# Patient Record
Sex: FEMALE | Race: WHITE | NOT HISPANIC OR LATINO | Employment: FULL TIME | URBAN - METROPOLITAN AREA
[De-identification: names, ages, dates, MRNs, and addresses within clinical notes are randomized per-mention and may not be internally consistent; named-entity substitution may affect disease eponyms.]

---

## 2017-04-10 ENCOUNTER — TELEPHONE (OUTPATIENT)
Dept: SURGERY | Facility: AMBULATORY SURGERY CENTER | Age: 32
End: 2017-04-10

## 2017-04-10 NOTE — TELEPHONE ENCOUNTER
Spoke to patient and asked her to call back after her pre-op appointment on 04/12/17 with Dr. Mayen.

## 2017-04-21 RX ORDER — ALBUTEROL SULFATE 90 UG/1
2 AEROSOL, METERED RESPIRATORY (INHALATION) EVERY 6 HOURS
COMMUNITY

## 2017-04-25 ENCOUNTER — ANESTHESIA EVENT (OUTPATIENT)
Dept: SURGERY | Facility: AMBULATORY SURGERY CENTER | Age: 32
End: 2017-04-25

## 2017-04-25 ENCOUNTER — ANESTHESIA (OUTPATIENT)
Dept: SURGERY | Facility: AMBULATORY SURGERY CENTER | Age: 32
End: 2017-04-25

## 2017-04-25 ENCOUNTER — HOSPITAL ENCOUNTER (OUTPATIENT)
Facility: AMBULATORY SURGERY CENTER | Age: 32
Discharge: HOME | End: 2017-04-25
Attending: PLASTIC SURGERY | Admitting: PLASTIC SURGERY
Payer: COMMERCIAL

## 2017-04-25 VITALS
DIASTOLIC BLOOD PRESSURE: 72 MMHG | RESPIRATION RATE: 16 BRPM | TEMPERATURE: 97.9 F | OXYGEN SATURATION: 99 % | SYSTOLIC BLOOD PRESSURE: 122 MMHG

## 2017-04-25 DIAGNOSIS — R11.0 NAUSEA AFTER ANESTHESIA, INITIAL ENCOUNTER: ICD-10-CM

## 2017-04-25 DIAGNOSIS — B99.9 INFECTION: ICD-10-CM

## 2017-04-25 DIAGNOSIS — R52 PAIN: Primary | ICD-10-CM

## 2017-04-25 DIAGNOSIS — T88.59XA NAUSEA AFTER ANESTHESIA, INITIAL ENCOUNTER: ICD-10-CM

## 2017-04-25 LAB — BETA HCG QUAL IFA URINE: NEGATIVE

## 2017-04-25 PROCEDURE — G8916 PT W IV AB GIVEN ON TIME: HCPCS

## 2017-04-25 PROCEDURE — 19342 INSJ/RPLCMT BRST IMPLT SEP D: CPT | Mod: 50

## 2017-04-25 PROCEDURE — G8907 PT DOC NO EVENTS ON DISCHARG: HCPCS

## 2017-04-25 PROCEDURE — 19328 RMVL INTACT BREAST IMPLANT: CPT | Mod: 50

## 2017-04-25 PROCEDURE — 19340 INSJ BREAST IMPLT SM D MAST: CPT | Mod: 50

## 2017-04-25 PROCEDURE — 84703 CHORIONIC GONADOTROPIN ASSAY: CPT | Performed by: FAMILY MEDICINE

## 2017-04-25 PROCEDURE — L8600 IMPLANT BREAST SILICONE/EQ: HCPCS | Mod: 50

## 2017-04-25 DEVICE — IMPLANTABLE DEVICE: Type: IMPLANTABLE DEVICE | Site: BREAST | Status: FUNCTIONAL

## 2017-04-25 RX ORDER — CEFAZOLIN SODIUM 1 G/3ML
INJECTION, POWDER, FOR SOLUTION INTRAMUSCULAR; INTRAVENOUS PRN
Status: DISCONTINUED | OUTPATIENT
Start: 2017-04-25 | End: 2017-04-25

## 2017-04-25 RX ORDER — DIAZEPAM 10 MG
10 TABLET ORAL EVERY 12 HOURS PRN
Status: DISCONTINUED
Start: 2017-04-25 | End: 2017-04-26 | Stop reason: HOSPADM

## 2017-04-25 RX ORDER — NALOXONE HYDROCHLORIDE 0.4 MG/ML
.1-.4 INJECTION, SOLUTION INTRAMUSCULAR; INTRAVENOUS; SUBCUTANEOUS
Status: DISCONTINUED | OUTPATIENT
Start: 2017-04-25 | End: 2017-04-26 | Stop reason: HOSPADM

## 2017-04-25 RX ORDER — DEXAMETHASONE SODIUM PHOSPHATE 4 MG/ML
4 INJECTION, SOLUTION INTRA-ARTICULAR; INTRALESIONAL; INTRAMUSCULAR; INTRAVENOUS; SOFT TISSUE EVERY 10 MIN PRN
Status: DISCONTINUED | OUTPATIENT
Start: 2017-04-25 | End: 2017-04-26 | Stop reason: HOSPADM

## 2017-04-25 RX ORDER — FENTANYL CITRATE 50 UG/ML
25-50 INJECTION, SOLUTION INTRAMUSCULAR; INTRAVENOUS
Status: DISCONTINUED | OUTPATIENT
Start: 2017-04-25 | End: 2017-04-26 | Stop reason: HOSPADM

## 2017-04-25 RX ORDER — FENTANYL CITRATE 50 UG/ML
INJECTION, SOLUTION INTRAMUSCULAR; INTRAVENOUS PRN
Status: DISCONTINUED | OUTPATIENT
Start: 2017-04-25 | End: 2017-04-25

## 2017-04-25 RX ORDER — PROPOFOL 10 MG/ML
INJECTION, EMULSION INTRAVENOUS CONTINUOUS PRN
Status: DISCONTINUED | OUTPATIENT
Start: 2017-04-25 | End: 2017-04-25

## 2017-04-25 RX ORDER — OXYCODONE AND ACETAMINOPHEN 5; 325 MG/1; MG/1
1-2 TABLET ORAL
Status: DISCONTINUED | OUTPATIENT
Start: 2017-04-25 | End: 2017-04-26 | Stop reason: HOSPADM

## 2017-04-25 RX ORDER — CEPHALEXIN 500 MG/1
500 CAPSULE ORAL 2 TIMES DAILY
Qty: 14 CAPSULE | Refills: 0
Start: 2017-04-25 | End: 2017-05-02

## 2017-04-25 RX ORDER — BUPIVACAINE HYDROCHLORIDE AND EPINEPHRINE 2.5; 5 MG/ML; UG/ML
INJECTION, SOLUTION INFILTRATION; PERINEURAL PRN
Status: DISCONTINUED | OUTPATIENT
Start: 2017-04-25 | End: 2017-04-25 | Stop reason: HOSPADM

## 2017-04-25 RX ORDER — MEPERIDINE HYDROCHLORIDE 25 MG/ML
12.5 INJECTION INTRAMUSCULAR; INTRAVENOUS; SUBCUTANEOUS
Status: DISCONTINUED | OUTPATIENT
Start: 2017-04-25 | End: 2017-04-26 | Stop reason: HOSPADM

## 2017-04-25 RX ORDER — ONDANSETRON 4 MG/1
8 TABLET, ORALLY DISINTEGRATING ORAL EVERY 6 HOURS PRN
Qty: 8 TABLET | Refills: 0
Start: 2017-04-25 | End: 2024-07-09

## 2017-04-25 RX ORDER — PROPOFOL 10 MG/ML
INJECTION, EMULSION INTRAVENOUS PRN
Status: DISCONTINUED | OUTPATIENT
Start: 2017-04-25 | End: 2017-04-25

## 2017-04-25 RX ORDER — HYDROXYZINE HYDROCHLORIDE 25 MG/1
25 TABLET, FILM COATED ORAL
Status: DISCONTINUED | OUTPATIENT
Start: 2017-04-25 | End: 2017-04-26 | Stop reason: HOSPADM

## 2017-04-25 RX ORDER — ONDANSETRON 2 MG/ML
INJECTION INTRAMUSCULAR; INTRAVENOUS PRN
Status: DISCONTINUED | OUTPATIENT
Start: 2017-04-25 | End: 2017-04-25

## 2017-04-25 RX ORDER — HYDROXYZINE PAMOATE 25 MG/1
25 CAPSULE ORAL EVERY 6 HOURS PRN
Qty: 30 CAPSULE | Refills: 0
Start: 2017-04-25 | End: 2024-07-09

## 2017-04-25 RX ORDER — HYDRALAZINE HYDROCHLORIDE 20 MG/ML
2.5-5 INJECTION INTRAMUSCULAR; INTRAVENOUS EVERY 10 MIN PRN
Status: DISCONTINUED | OUTPATIENT
Start: 2017-04-25 | End: 2017-04-26 | Stop reason: HOSPADM

## 2017-04-25 RX ORDER — ONDANSETRON 4 MG/1
4 TABLET, ORALLY DISINTEGRATING ORAL EVERY 30 MIN PRN
Status: DISCONTINUED | OUTPATIENT
Start: 2017-04-25 | End: 2017-04-26 | Stop reason: HOSPADM

## 2017-04-25 RX ORDER — CEFAZOLIN SODIUM 2 G/100ML
2 INJECTION, SOLUTION INTRAVENOUS
Status: COMPLETED | OUTPATIENT
Start: 2017-04-25 | End: 2017-04-25

## 2017-04-25 RX ORDER — HYDROCODONE BITARTRATE AND ACETAMINOPHEN 5; 325 MG/1; MG/1
1-2 TABLET ORAL EVERY 4 HOURS PRN
Qty: 30 TABLET | Refills: 0
Start: 2017-04-25 | End: 2024-07-09

## 2017-04-25 RX ORDER — SODIUM CHLORIDE, SODIUM LACTATE, POTASSIUM CHLORIDE, CALCIUM CHLORIDE 600; 310; 30; 20 MG/100ML; MG/100ML; MG/100ML; MG/100ML
INJECTION, SOLUTION INTRAVENOUS CONTINUOUS
Status: DISCONTINUED | OUTPATIENT
Start: 2017-04-25 | End: 2017-04-26 | Stop reason: HOSPADM

## 2017-04-25 RX ORDER — HYDROMORPHONE HYDROCHLORIDE 1 MG/ML
.3-.5 INJECTION, SOLUTION INTRAMUSCULAR; INTRAVENOUS; SUBCUTANEOUS EVERY 10 MIN PRN
Status: DISCONTINUED | OUTPATIENT
Start: 2017-04-25 | End: 2017-04-26 | Stop reason: HOSPADM

## 2017-04-25 RX ORDER — LIDOCAINE HYDROCHLORIDE 20 MG/ML
INJECTION, SOLUTION INFILTRATION; PERINEURAL PRN
Status: DISCONTINUED | OUTPATIENT
Start: 2017-04-25 | End: 2017-04-25

## 2017-04-25 RX ORDER — DEXAMETHASONE SODIUM PHOSPHATE 4 MG/ML
10 INJECTION, SOLUTION INTRA-ARTICULAR; INTRALESIONAL; INTRAMUSCULAR; INTRAVENOUS; SOFT TISSUE EVERY 6 HOURS
Status: DISCONTINUED | OUTPATIENT
Start: 2017-04-25 | End: 2017-04-26 | Stop reason: HOSPADM

## 2017-04-25 RX ORDER — ONDANSETRON 2 MG/ML
4 INJECTION INTRAMUSCULAR; INTRAVENOUS EVERY 30 MIN PRN
Status: DISCONTINUED | OUTPATIENT
Start: 2017-04-25 | End: 2017-04-26 | Stop reason: HOSPADM

## 2017-04-25 RX ORDER — GLYCOPYRROLATE 0.2 MG/ML
INJECTION, SOLUTION INTRAMUSCULAR; INTRAVENOUS PRN
Status: DISCONTINUED | OUTPATIENT
Start: 2017-04-25 | End: 2017-04-25

## 2017-04-25 RX ORDER — LIDOCAINE 40 MG/G
CREAM TOPICAL
Status: DISCONTINUED | OUTPATIENT
Start: 2017-04-25 | End: 2017-04-26 | Stop reason: HOSPADM

## 2017-04-25 RX ORDER — ACETAMINOPHEN 325 MG/1
975 TABLET ORAL ONCE
Status: COMPLETED | OUTPATIENT
Start: 2017-04-25 | End: 2017-04-25

## 2017-04-25 RX ORDER — GABAPENTIN 300 MG/1
300 CAPSULE ORAL ONCE
Status: COMPLETED | OUTPATIENT
Start: 2017-04-25 | End: 2017-04-25

## 2017-04-25 RX ORDER — OXYCODONE AND ACETAMINOPHEN 5; 325 MG/1; MG/1
1-2 TABLET ORAL EVERY 4 HOURS PRN
Qty: 30 TABLET | Refills: 0
Start: 2017-04-25 | End: 2024-07-09

## 2017-04-25 RX ORDER — OXYCODONE HYDROCHLORIDE 5 MG/1
5 TABLET ORAL EVERY 4 HOURS PRN
Status: DISCONTINUED | OUTPATIENT
Start: 2017-04-25 | End: 2017-04-26 | Stop reason: HOSPADM

## 2017-04-25 RX ORDER — ONDANSETRON 4 MG/1
4 TABLET, ORALLY DISINTEGRATING ORAL
Status: DISCONTINUED | OUTPATIENT
Start: 2017-04-25 | End: 2017-04-26 | Stop reason: HOSPADM

## 2017-04-25 RX ORDER — METOPROLOL TARTRATE 1 MG/ML
1-2 INJECTION, SOLUTION INTRAVENOUS EVERY 5 MIN PRN
Status: DISCONTINUED | OUTPATIENT
Start: 2017-04-25 | End: 2017-04-26 | Stop reason: HOSPADM

## 2017-04-25 RX ADMIN — CEFAZOLIN SODIUM 2 G: 2 INJECTION, SOLUTION INTRAVENOUS at 07:28

## 2017-04-25 RX ADMIN — ACETAMINOPHEN 975 MG: 325 TABLET ORAL at 07:10

## 2017-04-25 RX ADMIN — SODIUM CHLORIDE, SODIUM LACTATE, POTASSIUM CHLORIDE, CALCIUM CHLORIDE: 600; 310; 30; 20 INJECTION, SOLUTION INTRAVENOUS at 07:16

## 2017-04-25 RX ADMIN — SODIUM CHLORIDE, SODIUM LACTATE, POTASSIUM CHLORIDE, CALCIUM CHLORIDE: 600; 310; 30; 20 INJECTION, SOLUTION INTRAVENOUS at 07:25

## 2017-04-25 RX ADMIN — FENTANYL CITRATE 100 MCG: 50 INJECTION, SOLUTION INTRAMUSCULAR; INTRAVENOUS at 07:27

## 2017-04-25 RX ADMIN — HYDROMORPHONE HYDROCHLORIDE 0.5 MG: 1 INJECTION, SOLUTION INTRAMUSCULAR; INTRAVENOUS; SUBCUTANEOUS at 10:18

## 2017-04-25 RX ADMIN — SODIUM CHLORIDE, SODIUM LACTATE, POTASSIUM CHLORIDE, CALCIUM CHLORIDE: 600; 310; 30; 20 INJECTION, SOLUTION INTRAVENOUS at 08:15

## 2017-04-25 RX ADMIN — CEFAZOLIN SODIUM 1 G: 1 INJECTION, POWDER, FOR SOLUTION INTRAMUSCULAR; INTRAVENOUS at 09:33

## 2017-04-25 RX ADMIN — Medication 10 MG: at 08:15

## 2017-04-25 RX ADMIN — GABAPENTIN 300 MG: 300 CAPSULE ORAL at 07:10

## 2017-04-25 RX ADMIN — HYDROMORPHONE HYDROCHLORIDE 0.5 MG: 1 INJECTION, SOLUTION INTRAMUSCULAR; INTRAVENOUS; SUBCUTANEOUS at 10:08

## 2017-04-25 RX ADMIN — GLYCOPYRROLATE 0.2 MG: 0.2 INJECTION, SOLUTION INTRAMUSCULAR; INTRAVENOUS at 07:26

## 2017-04-25 RX ADMIN — PROPOFOL 150 MCG/KG/MIN: 10 INJECTION, EMULSION INTRAVENOUS at 07:30

## 2017-04-25 RX ADMIN — DEXAMETHASONE SODIUM PHOSPHATE 10 MG: 4 INJECTION, SOLUTION INTRA-ARTICULAR; INTRALESIONAL; INTRAMUSCULAR; INTRAVENOUS; SOFT TISSUE at 07:50

## 2017-04-25 RX ADMIN — LIDOCAINE HYDROCHLORIDE 40 MG: 20 INJECTION, SOLUTION INFILTRATION; PERINEURAL at 07:28

## 2017-04-25 RX ADMIN — PROPOFOL 200 MG: 10 INJECTION, EMULSION INTRAVENOUS at 07:30

## 2017-04-25 RX ADMIN — ONDANSETRON 4 MG: 2 INJECTION INTRAMUSCULAR; INTRAVENOUS at 08:00

## 2017-04-25 RX ADMIN — OXYCODONE HYDROCHLORIDE 5 MG: 5 TABLET ORAL at 10:50

## 2017-04-25 NOTE — ANESTHESIA PREPROCEDURE EVALUATION
Anesthesia Evaluation     . Pt has had prior anesthetic. Type: General    No history of anesthetic complications          ROS/MED HX    ENT/Pulmonary:     (+)Mild Persistent asthma Treatment: Inhaler daily,  , . .    Neurologic:  - neg neurologic ROS     Cardiovascular:  - neg cardiovascular ROS       METS/Exercise Tolerance:     Hematologic:  - neg hematologic  ROS       Musculoskeletal:  - neg musculoskeletal ROS       GI/Hepatic:  - neg GI/hepatic ROS       Renal/Genitourinary:  - ROS Renal section negative       Endo:  - neg endo ROS       Psychiatric:  - neg psychiatric ROS       Infectious Disease:  - neg infectious disease ROS       Malignancy:      - no malignancy   Other:    (+) No chance of pregnancy                    Physical Exam  Normal systems: cardiovascular, pulmonary and dental    Airway   Mallampati: I  TM distance: >3 FB  Neck ROM: full    Dental     Cardiovascular       Pulmonary    breath sounds clear to auscultation                    Anesthesia Plan      History & Physical Review  History and physical reviewed and following examination; no interval change.    ASA Status:  2 .    NPO Status:  > 8 hours    Plan for General and LMA with Intravenous and Propofol induction. Maintenance will be TIVA.    PONV prophylaxis:  Ondansetron (or other 5HT-3) and Dexamethasone or Solumedrol       Postoperative Care  Postoperative pain management:  Multi-modal analgesia.      Consents                          .

## 2017-04-25 NOTE — IP AVS SNAPSHOT
[facility]    [address] [place] [address]    Phone:  [phone]                                       After Visit Summary   4/25/2017    Dana Hernandez    MRN: [id number]           After Visit Summary Signature Page     I have received my discharge instructions, and my questions have been answered. I have discussed any challenges I see with this plan with the nurse or doctor.    ..........................................................................................................................................  Patient/Patient Representative Signature      ..........................................................................................................................................  Patient Representative Print Name and Relationship to Patient    ..................................................               ................................................  Date                                            Time    ..........................................................................................................................................  Reviewed by Signature/Title    ...................................................              ..............................................  Date                                                            Time

## 2017-04-25 NOTE — BRIEF OP NOTE
Preop Diagnosis: hypomastia  Postop diagnosis: same  Operation: remove and replace breast implants, plication of bilateral breas tpockets  EBL<30cc  Complications: none  Assist: none  Findings: none  Specimens: none  Condition: extubated and stable to PAR    Raimundo Mayen MD

## 2017-04-25 NOTE — DISCHARGE INSTRUCTIONS
[facility]  Same-Day Surgery   Adult Discharge Orders & Instructions   For 24 hours after surgery  1. Get plenty of rest.  A responsible adult must stay with you for at least 24 hours after you leave the hospital.   2. Do not drive or use heavy equipment.  If you have weakness or tingling, don't drive or use heavy equipment until this feeling goes away.  3. Do not drink alcohol.  4. Avoid strenuous or risky activities.  Ask for help when climbing stairs.   5. You may feel lightheaded.  IF so, sit for a few minutes before standing.  Have someone help you get up.   6. If you have nausea (feel sick to your stomach): Drink only clear liquids such as apple juice, ginger ale, broth or 7-Up.  Rest may also help.  Be sure to drink enough fluids.  Move to a regular diet as you feel able.  7. You may have a slight fever. Call the doctor if your fever is over 100 F (37.7 C) (taken under the tongue) or lasts longer than 24 hours.  8. You may have a dry mouth, a sore throat, muscle aches or trouble sleeping.  These should go away after 24 hours.  9. Do not make important or legal decisions.   Call your doctor for any of the followin.  Signs of infection (fever, growing tenderness at the surgery site, a large amount of drainage or bleeding, severe pain, foul-smelling drainage, redness, swelling).    2. It has been over 8 to 10 hours since surgery and you are still not able to urinate (pass water).    3.  Headache for over 24 hours.    4.  Numbness, tingling or weakness the day after surgery (if you had spinal anesthesia).  To contact a doctor, call ___[phone]________________________

## 2017-04-25 NOTE — IP AVS SNAPSHOT
MRN:[id number]                      After Visit Summary   4/25/2017    Dana Hernandez    MRN: [id number]           Thank you!     Thank you for choosing [facility] for your care. Our goal is always to provide you with excellent care. Hearing back from our patients is one way we can continue to improve our services. Please take a few minutes to complete the written survey that you may receive in the mail after you visit with us. Thank you!        Patient Information     Date Of Birth          1985        About your hospital stay     You were admitted on:  April 25, 2017 You last received care in the:  [facility]    You were discharged on:  April 25, 2017       Who to Call     For medical emergencies, please call 911.  For non-urgent questions about your medical care, please call your primary care provider or clinic, None  For questions related to your surgery, please call your surgery clinic        Attending Provider     Provider Specialty    Raimundo Mayen MD Plastic Surgery       Primary Care Provider    None Specified       No primary provider on file.        After Care Instructions     Discharge Instructions       Resume pre procedure diet            Discharge Instructions       Lifting limit of  20 pounds for 2 weeks. May begin gentle arm stretches this afternoon            Discharge Instructions       Leave surgical bra on overnight until seen in clinic tomorrow            Discharge Instructions       Follow up with Surgeon in [place] tomorrow at 1pm.  Call clinic with any problems.            No Alcohol       No Alcohol for 24 hours post procedure            No Aspirin, Ibuprofen or Naproxen       No Aspirin, Ibuprofen or Naproxen products for 7 - 10 days following surgery            No driving or operating machinery       No driving or operating machinery until day after procedure                  Further instructions from your care team       [facility]  Same-Day Surgery   Adult Discharge Orders & Instructions   For 24 hours after surgery  1. Get plenty of rest.  A responsible adult must stay with you for at least 24 hours after you leave the hospital.   2. Do not drive or use heavy equipment.  If you have weakness or tingling, don't drive or use heavy equipment until this feeling goes away.  3. Do not drink alcohol.  4. Avoid strenuous or risky activities.  Ask for help when climbing stairs.   5. You may feel lightheaded.  IF so, sit for a few minutes before standing.  Have someone help you get up.   6. If you have nausea (feel sick to your stomach): Drink only clear liquids such as apple juice, ginger ale, broth or 7-Up.  Rest may also help.  Be sure to drink enough fluids.  Move to a regular diet as you feel able.  7. You may have a slight fever. Call the doctor if your fever is over 100 F (37.7 C) (taken under the tongue) or lasts longer than 24 hours.  8. You may have a dry mouth, a sore throat, muscle aches or trouble sleeping.  These should go away after 24 hours.  9. Do not make important or legal decisions.   Call your doctor for any of the followin.  Signs of infection (fever, growing tenderness at the surgery site, a large amount of drainage or bleeding, severe pain, foul-smelling drainage, redness, swelling).    2. It has been over 8 to 10 hours since surgery and you are still not able to urinate (pass water).    3.  Headache for over 24 hours.    4.  Numbness, tingling or weakness the day after surgery (if you had spinal anesthesia).  To contact a doctor, call ___[phone]________________________       Pending Results     No orders found from 2017 to 2017.            Admission Information     Date & Time Provider Department Dept. Phone    2017 Raimundo Mayen MD [facility] [phone]      Your Vitals Were     Blood Pressure Temperature Respirations Pulse Oximetry          129/72 97.9  F  "(36.6  C) (Temporal) 16 100%        [employer] Information     [email or web address] lets you send messages to your doctor, view your test results, renew your prescriptions, schedule appointments and more. To sign up, go to [email or web address] . Click on \"Log in\" on the left side of the screen, which will take you to the Welcome page. Then click on \"Sign up Now\" on the right side of the page.     You will be asked to enter the access code listed below, as well as some personal information. Please follow the directions to create your username and password.     Your access code is: [email or web address]  Expires: 2017  9:53 AM     Your access code will  in 90 days. If you need help or a new code, please call your [facility] clinic or [phone].        Care EveryWhere ID     This is your Care EveryWhere ID. This could be used by other organizations to access your [facility] medical records  [id number]           Review of your medicines      UNREVIEWED medicines. Ask your doctor about these medicines        Dose / Directions    albuterol 108 (90 BASE) MCG/ACT Inhaler   Commonly known as:  PROAIR HFA/PROVENTIL HFA/VENTOLIN HFA   Indication:  Asthma        Dose:  2 puff   Inhale 2 puffs into the lungs every 6 hours   Refills:  0       mometasone-formoterol 100-5 MCG/ACT oral inhaler   Commonly known as:  DULERA   Indication:  Asthma        Dose:  2 puff   Inhale 2 puffs into the lungs 2 times daily   Refills:  0         START taking        Dose / Directions    cephALEXin 500 MG capsule   Commonly known as:  KEFLEX   Used for:  Infection        Dose:  500 mg   Take 1 capsule (500 mg) by mouth 2 times daily for 7 days Start with 2 tabs this afternoon, one ore at bedtime tonight. Filled as Duricef   Quantity:  14 capsule   Refills:  0       HYDROcodone-acetaminophen 5-325 MG per tablet   Commonly known as:  NORCO   Used for:  Pain        Dose:  1-2 tablet   Take 1-2 tablets by mouth every 4 hours as needed for other (Moderate to " Severe Pain)   Quantity:  30 tablet   Refills:  0       hydrOXYzine 25 MG capsule   Commonly known as:  VISTARIL   Used for:  Pain        Dose:  25 mg   Take 1 capsule (25 mg) by mouth every 6 hours as needed for other (take with Norco or percocet to enhance pain relief)   Quantity:  30 capsule   Refills:  0       ondansetron 4 MG ODT tab   Commonly known as:  ZOFRAN-ODT   Used for:  Nausea after anesthesia, initial encounter        Dose:  8 mg   Take 2 tablets (8 mg) by mouth every 6 hours as needed for nausea   Quantity:  8 tablet   Refills:  0       oxyCODONE-acetaminophen 5-325 MG per tablet   Commonly known as:  PERCOCET   Used for:  Pain        Dose:  1-2 tablet   Take 1-2 tablets by mouth every 4 hours as needed for pain   Quantity:  30 tablet   Refills:  0            Where to get your medicines      Some of these will need a paper prescription and others can be bought over the counter. Ask your nurse if you have questions.     You don't need a prescription for these medications     cephALEXin 500 MG capsule    HYDROcodone-acetaminophen 5-325 MG per tablet    hydrOXYzine 25 MG capsule    ondansetron 4 MG ODT tab    oxyCODONE-acetaminophen 5-325 MG per tablet                Protect others around you: Learn how to safely use, store and throw away your medicines at www.disposemymeds.org.             Medication List: This is a list of all your medications and when to take them. Check marks below indicate your daily home schedule. Keep this list as a reference.      Medications           Morning Afternoon Evening Bedtime As Needed    albuterol 108 (90 BASE) MCG/ACT Inhaler   Commonly known as:  PROAIR HFA/PROVENTIL HFA/VENTOLIN HFA   Inhale 2 puffs into the lungs every 6 hours                                cephALEXin 500 MG capsule   Commonly known as:  KEFLEX   Take 1 capsule (500 mg) by mouth 2 times daily for 7 days Start with 2 tabs this afternoon, one ore at bedtime tonight. Filled as Duricef                                 HYDROcodone-acetaminophen 5-325 MG per tablet   Commonly known as:  NORCO   Take 1-2 tablets by mouth every 4 hours as needed for other (Moderate to Severe Pain)                                hydrOXYzine 25 MG capsule   Commonly known as:  VISTARIL   Take 1 capsule (25 mg) by mouth every 6 hours as needed for other (take with Norco or percocet to enhance pain relief)                                mometasone-formoterol 100-5 MCG/ACT oral inhaler   Commonly known as:  DULERA   Inhale 2 puffs into the lungs 2 times daily                                ondansetron 4 MG ODT tab   Commonly known as:  ZOFRAN-ODT   Take 2 tablets (8 mg) by mouth every 6 hours as needed for nausea                                oxyCODONE-acetaminophen 5-325 MG per tablet   Commonly known as:  PERCOCET   Take 1-2 tablets by mouth every 4 hours as needed for pain

## 2017-04-25 NOTE — ANESTHESIA POSTPROCEDURE EVALUATION
Patient: Dana Hernandez    Procedure(s):  Remove saline breast implants, replace with silicone implants, plicate bilateral breast folds - Wound Class: I-Clean    Diagnosis:replace implants  Diagnosis Additional Information: No value filed.    Anesthesia Type:  General, LMA    Note:  Anesthesia Post Evaluation    Patient location during evaluation: PACU  Patient participation: Able to fully participate in evaluation  Level of consciousness: awake  Pain management: adequate  Airway patency: patent  Cardiovascular status: acceptable  Respiratory status: acceptable  Hydration status: acceptable  PONV: none     Anesthetic complications: None    Comments: No nausea.  Excellent recovery noted.        Last vitals:  Vitals:    04/25/17 1010 04/25/17 1015 04/25/17 1020   BP: 128/68 132/72 128/67   Resp: 28 22 16   Temp:      SpO2: 99% 99% 98%         Electronically Signed By: Unruly Paula MD  April 25, 2017  10:33 AM

## 2017-04-25 NOTE — ANESTHESIA CARE TRANSFER NOTE
Patient: Dana Hernandez    Procedure(s):  Remove saline breast implants, replace with silicone implants, plicate bilateral breast folds - Wound Class: I-Clean    Diagnosis: replace implants  Diagnosis Additional Information: No value filed.    Anesthesia Type:   General, LMA     Note:  Airway :Room Air  Patient transferred to:PACU  Comments: Care of Transfer report given to RN.  Spont Respirations. Responds   Easy.      Vitals: (Last set prior to Anesthesia Care Transfer)    CRNA VITALS  4/25/2017 0933 - 4/25/2017 1003      4/25/2017             Pulse: 121    SpO2: 99 %    Resp Rate (observed): (!)  2                Electronically Signed By: EMILIANO MARTEL CRNA  April 25, 2017  10:03 AM

## 2017-05-15 NOTE — OP NOTE
DATE OF PROCEDURE:  04/25/2017      PREOPERATIVE DIAGNOSES:   1.  History of breast augmentation by Dr. Anisa Solorzano in 05/2006 with saline breast implants.   2.  Bottoming of left saline breast implant.   3.  Implant malposition with lateralization of the breast pockets left greater than right.      POSTOPERATIVE DIAGNOSES:   1.  History of breast augmentation by Dr. Anisa Solorzano in 05/2006 with saline breast implants.   2.  Bottoming of left saline breast implant.   3.  Implant malposition with lateralization of the breast pockets left greater than right.      PROCEDURE:  Removal of saline breast implants placement and replacement silicone gel breast implants and plication of breast pocket, both with elevation of the left inframammary fold as well as plication of the curvilinear lateral aspect of her breasts, left greater than right.        REPLACEMENT BREAST IMPLANTS:     1.  Left breast [place] style 4000 400 mL smooth round high profile [place] MemoryGel breast implant, reference number [id number], lot number is [id number], serial number is [id number].     2.  Right breast [place] style 4000 400 mL smooth round high profile [place] MemoryGel breast implant, reference number [id number], lot number is [id number], serial number is [id number].      ANESTHESIA:  General endotracheal.      INDICATIONS:  Dana Hernandez is a very pleasant and attractive 31-year-old female who underwent saline breast implants, breast augmentation by Dr. Anisa Solorzano in 05/2006 when she was 20 years of age.  The patient received saline breast implants.  For many years she has been troubled by breast implant malposition whereby her left breast has both bottomed out and has a lateral malposition.  Her right breast is primarily a problem of lateral breast implant malposition.  The patient is troubled by rippling and wrinkling of her saline breast implants, particularly in the areas where the pockets have become lateralized.  The  patient was given options for saline and silicone gel breast implants.  The patient was told that primary of saline breast implant is that the fill material being saline is absorbed should the implant ever fail.  The patient is well aware of saline breast implants has the distinct disadvantage of increased tendency towards rippling and wrinkling, this is especially true in the lateral or outer quadrants of the breast.  Silicone gel breast implant have the inherent of the less intensity towards rippling and wrinkling due to the viscosity of the Gelfilm material.  Silicone gel breast implants may rupture and neither the patient nor the surgeon are aware of this; this phenomenon is known as silent rupture.  I recommend my patients all have a breast MRI scan at 10 years postoperatively, to check on the integrity of their breast implants.  A study by Seferino Ruiz at the [facility] showed under 1% failure rate for silicone gel breast implants at 5 years.  A 10 year surveillance MRI seems to be reasonable timing.  The patient was told silicone gel breast implants do have a slightly higher tendency towards capsular contracture than saline breast implants.  In my practice the chance of capsular contracture is between 2-3% when implants placed in a submuscular position through an inframammary fold incision as would be the case for this patient.  Maneuvers surgeons can utilize to diminish chance of capsular contracture include submuscular breast implant placement.  Submuscular breast implant placement confers a nearly 3 fold reduction in rate of capsular contracture from 15% in the subglandular breast augmentation to 5% seen in submuscular breast augmentation.  Use of inframammary fold incision was shown to confer a 5-fold reduction rate of capsular contracture in clinical studies performed by Dr. Dominguez Freedman, from [facility].  In his studies, the rate of capsular contracture was 0.59%  when inframammary fold incision was used and 9.5% when a periareolar incision was used.  This dramatic decrease in rate of capsular contracture is felt to be due to the fact that the nipple areolar complex cannot be completely sterilized and therefore susceptible to development of a biofilm.  Throughout my career I have used breast implant pocket irrigations consisting consist of Ancef, gentamicin and bacitracin to decrease the chance of capsular contracture.  Use of these pocket irrigations has been shown to decrease the rate of capsular contracture in clinical studies performed by Dr. Jaleel Perera, of [facility].  For the last 3 years I have been using a Nevarez funnel for placement of breast implants.  The Nevarez funnel should theoretically decrease the rate of capsular contracture as it allows for no-touch technique for placement of the breast implant.      The patient understands that when she is age 39 she should have screening mammograms.  She should tell her mammographer she has breast implants as special views known as Jay views and special compression techniques are used to best visualize the breast following augmentation mammoplasty.  The risks of the operation include chance of capsular contracture, bleeding, infection, hematoma, seroma, hypertrophic scarring and recurrence of her implant malposition from original surgery performed by Dr. Solorzano.  The patient was given a chance to ask questions and all were answered.  The patient provides her informed consent for the procedure both at the [facility] [place] as well as well as she gave consent to our in-office consent form.      DESCRIPTION OF PROCEDURE:  The patient was taken to the operating room, placed in supine position on the operating room table.  A successful general endotracheal anesthetic was then induced.  The patient received 2 grams of Ancef and 10 mg of Decadron intravenously prior to commencing  with surgery.  The patient's chest was then prepped and draped in routine sterile fashion.  The old scar from Dr. Solorzano's breast augmentation was excised.  Through this incision dissection was developed down to the breast capsule.  The right breast implant is a saline breast was completely intact upon removal.  In the preoperative holding area I had marked the extent of her pocket lateralization.  Using this marking as a guide, I inserted a needle through the skin into the pocket where it was marked with methylene blue and withdrawn.  A series of dots was created, which showed where her natural breast pocket should start.  This was then transferred by means marking pen to her chest wall.  The intervening capsule was removed between the markings.  I also reinforced the inframammary fold and in curvilinear fashion starting medially as well to follow a new areolar base diameter to correspond with the 400 mL high profile breast implant to be used.  The pocket plication sutures were then placed using 2-0 PDS suture in curvilinear fashion, the sutures placed every centimeter with 2-0 PDS in horizontal mattressing fashion.  The saline sizer was placed gave nice shape to her breast.  I then performed a capsulorrhaphy in longitudinal and latitudinal fashion to allow for a more projecting high profile breast implant.  The pocket was then irrigated with a solution consisting of 1 gram of Ancef, 80 mg of gentamicin and bacitracin in saline.  This was done in accordance as stated by Dr. Jaleel Perera, [facility], as a clinically proven means of diminishing the chance of capsular contracture.  The breast pocket was then injected with 0.25% Marcaine with 1:200,000 epinephrine and Kenalog for postoperative analgesia and for the anti-inflammatory properties of the steroid.  At this point, all operating personnel changed their gloves.  The [place] MemoryGel breast implant was opened on the back table and soaked  in the antibiotic solution.  Nevarez funnel was then opened and trimmed to the appropriate size for a 400 mL implant.  The Nevarez funnel was lubricated with the antibiotic solution.  The implant was emptied from its sterile container into the Nevarez funnel then inserted into the right breast pocket using a no-touch technique.  Closure of the inframammary fold incision starting at the capsule layer with 2-0 PDS suture in buried interrupted fashion.  Deep dermal sutures were then placed using 4-0 PDS suture in buried interrupted fashion.  I then placed intradermal suture of 4-0 Monocryl in buried interrupted fashion.  The final skin closure was performed using 4-0 Prolene suture in running and buried continuous intracuticular fashion.      Attention was then directed to the left side where the identical operation was performed.  On the left side, I had to treat severe bottoming out of the breast pocket as well as lateralization.  The lateralization was worse on the left than right.  Again, used the markings obtained in the preoperative holding area to transfer the curvilinear natural lateral border of her breast internally using the 25-gauge needle and methylene blue.  The markings were transferred to the chest wall and the capsule between the markings was removed, pocket plicating sutures were placed in curvilinear fashion treating both the lateralization and bottoming out of breast pocket with 2-0 PDS sutures placed every centimeter in horizontal mattressing fashion.  Again, the breast pocket was made to be more projecting with capsulorrhaphies performed in longitudinal and latitudinal fashion to accept the more projecting 400 mL smooth round high profile breast implant.  Again, the breast pocket was irrigated with the Ancef, gentamicin and bacitracin solution.  The pocket was then injected with 0.25% Marcaine with 1:200,000 epinephrine and Kenalog for postoperative analgesia for the anti-inflammatory properties of  the steroids.  The implant was then opened on the back table and soaked in antibiotic solution.  The Nevarez funnel was then lubricated with the antibiotic solution.  The implant was transferred from the sterile container into the Nevarez funnel and inserted using a no-touch technique.  Closure was identical from left to right.  Dressing consisted of half-inch Steri-Strips over the inframammary fold incisions followed by Adaptic, Kerlix fluffs and surgical block.  It should be noted 0.25% Marcaine with 1:100,000 epinephrine was injected to total additional 15 mL each side both beneath the incision and the lateral aspect of the breast where the third, fourth and fifth intercostal nerves innervate the breast for postoperative analgesia.        I should note that an incision was made at 7:37 a.m.  Patient received 1500 mL of intravenous crystalloid fluid.  Estimated blood loss was 25 mL.         MERI LONDONO MD             D: 05/15/2017 11:34   T: 05/15/2017 15:42   MT: [id number]      Name:     BRAEDEN ADLER   MRN:      [id number]        Account:        [id number]   :      1985           Procedure Date: 2017      Document: [id number]

## 2023-01-17 ENCOUNTER — TRANSCRIBE ORDERS (OUTPATIENT)
Dept: OTHER | Age: 38
End: 2023-01-17

## 2023-01-17 DIAGNOSIS — L73.2 HIDRADENITIS SUPPURATIVA: Primary | ICD-10-CM

## 2023-05-09 NOTE — TELEPHONE ENCOUNTER
FUTURE VISIT INFORMATION      FUTURE VISIT INFORMATION:    Date: 6.26.23    Time: 9:50    [place] [facility] [place] INFORMATION:    Referring provider:  Yanely    Referring providers clinic:  [facility]    Reason for visit/diagnosis  Hidradenitis suppurativa,     referred by [facility]    RECORDS REQUESTED FROM:       Clinic name Comments Records Status Imaging Status   [facility] 12.9.22, 10.29.21  Yanely AVILES

## 2023-06-26 ENCOUNTER — PRE VISIT (OUTPATIENT)
Dept: DERMATOLOGY | Facility: CLINIC | Age: 38
End: 2023-06-26

## 2023-06-26 ENCOUNTER — OFFICE VISIT (OUTPATIENT)
Dept: DERMATOLOGY | Facility: CLINIC | Age: 38
End: 2023-06-26
Attending: INTERNAL MEDICINE
Payer: COMMERCIAL

## 2023-06-26 DIAGNOSIS — L73.2 HIDRADENITIS SUPPURATIVA: Primary | ICD-10-CM

## 2023-06-26 PROCEDURE — 99204 OFFICE O/P NEW MOD 45 MIN: CPT | Performed by: DERMATOLOGY

## 2023-06-26 RX ORDER — DEXTROAMPHETAMINE SACCHARATE, AMPHETAMINE ASPARTATE MONOHYDRATE, DEXTROAMPHETAMINE SULFATE AND AMPHETAMINE SULFATE 6.25; 6.25; 6.25; 6.25 MG/1; MG/1; MG/1; MG/1
CAPSULE, EXTENDED RELEASE ORAL
COMMUNITY
Start: 2023-03-04

## 2023-06-26 RX ORDER — CLINDAMYCIN PHOSPHATE 11.9 MG/ML
SOLUTION TOPICAL 2 TIMES DAILY
Qty: 60 ML | Refills: 11 | Status: SHIPPED | OUTPATIENT
Start: 2023-06-26 | End: 2024-07-01

## 2023-06-26 RX ORDER — SPIRONOLACTONE 100 MG/1
100 TABLET, FILM COATED ORAL AT BEDTIME
Qty: 90 TABLET | Refills: 3 | Status: SHIPPED | OUTPATIENT
Start: 2023-06-26 | End: 2024-07-01

## 2023-06-26 ASSESSMENT — PAIN SCALES - GENERAL: PAINLEVEL: NO PAIN (0)

## 2023-06-26 NOTE — PROGRESS NOTES
[facility] Dermatology Note    Encounter Date: Jun 26, 2023    Dermatology Problem List:  1. Hidradenitis suppurativa: early Del Cid II affecting pubis/groin > axillae  - current: chlorhexidine, clindamycin, spironolactone 100 mg at bedtime, OCPs  - referral for LHR  - prior: doxycycline  2. Recurrent right axillary subcutaneous nodule: possibly related to hidradenitis suppurativa  - prior imaging nonspecific    ______________________________________    Impression/Plan:  1. Hidradenitis suppurativa: early Del Cid II affecting pubis/groin > axillae. Some early scarring and sinus tract formation. We discussed risks/benefits of next steps in treatment including topicals, systemic antibiotics, spironolactone, metformin, adalimumab, LHR. Will add spironolactone and refer for LHR.  - spironolactone 100 mg at bedtime  - chlorhexidine, clindamycin solution  - referral for laser hair removal      Follow-up in 3-4 months.       Staff Involved:  Staff Only      Unruly Kuo MD   of Dermatology  Department of Dermatology  [facility]      CC:   Chief Complaint   Patient presents with     Derm Problem     Dana is here for hidradenitis suppurativa evaluation.        History of Present Illness:  Ms. Dana Hernandez is a 37 year old female who presents as a new patient.    Hidradenitis suppurativa - last 2-3 years  - with scarring, tunneling  - worse around period - restarted birth control 3-4 months ago - not much difference  - a little better with clindamycin and chlorhexidine  - prior doxycycline - helpful when taking but recurred when stopped    Right axillary lump - multiple assessments by imaging - waxes and wanes in severity  - causing radiating pain on right side of body    Labs:  N/A    Physical exam:  Vitals: There were no vitals taken for this visit.  GEN: This is a well developed, well-nourished female in no acute distress, in a pleasant mood.     SKIN: Plunkett phototype III  - Focused examination of the axillae, groin, buttocks, pubic area was performed.  - multiple erythematous scarred papules on the pubis and groin with early sinus tract formation and several scattered erythematous papules in the axillae  - No other lesions of concern on areas examined.     Past Medical History:   Past Medical History:   Diagnosis Date     Uncomplicated asthma      Past Surgical History:   Procedure Laterality Date     COSMETIC SURGERY       REMOVE AND REPLACE BREAST IMPLANT PROSTHESIS Bilateral 4/25/2017    Procedure: REMOVE AND REPLACE BREAST IMPLANT PROSTHESIS;  Remove saline breast implants, replace with silicone implants, plicate bilateral breast folds;  Surgeon: Raimundo Mayen MD;  Location:  OR       Social History:   reports that she has never smoked. She does not have any smokeless tobacco history on file. She reports that she does not drink alcohol and does not use drugs.    Family History:  No family history on file.    Medications:  Current Outpatient Medications   Medication Sig Dispense Refill     albuterol (PROAIR HFA/PROVENTIL HFA/VENTOLIN HFA) 108 (90 BASE) MCG/ACT Inhaler Inhale 2 puffs into the lungs every 6 hours       amphetamine-dextroamphetamine (ADDERALL XR) 25 MG 24 hr capsule TAKE 1 CAPSULE BY MOUTH EACH MORNING       mometasone-formoterol (DULERA) 100-5 MCG/ACT oral inhaler Inhale 2 puffs into the lungs 2 times daily       HYDROcodone-acetaminophen (NORCO) 5-325 MG per tablet Take 1-2 tablets by mouth every 4 hours as needed for other (Moderate to Severe Pain) (Patient not taking: Reported on 6/26/2023) 30 tablet 0     hydrOXYzine (VISTARIL) 25 MG capsule Take 1 capsule (25 mg) by mouth every 6 hours as needed for other (take with Norco or percocet to enhance pain relief) (Patient not taking: Reported on 6/26/2023) 30 capsule 0     ondansetron (ZOFRAN-ODT) 4 MG ODT tab Take 2 tablets (8 mg) by mouth every 6 hours as needed for  nausea (Patient not taking: Reported on 6/26/2023) 8 tablet 0     oxyCODONE-acetaminophen (PERCOCET) 5-325 MG per tablet Take 1-2 tablets by mouth every 4 hours as needed for pain (Patient not taking: Reported on 6/26/2023) 30 tablet 0     No Known Allergies

## 2023-06-26 NOTE — NURSING NOTE
Dermatology Rooming Note    Dana Hernandez's goals for this visit include:   Chief Complaint   Patient presents with     Derm Problem     Dana is here for hidradenitis suppurativa evaluation.      Jacky Leyva, EMT

## 2023-06-26 NOTE — PROGRESS NOTES
[facility] Dermatology Note  Encounter Date: Jun 26, 2023  Office Visit     Dermatology Problem List:  1. ***    ____________________________________________    Assessment & Plan:    # {Diagnosesderm:105108}.   {kkplans:049397}   - ***     # {Diagnosesderm:655474}.   {kkplans:874268}   - ***     Procedures Performed:   {kkprocedurenotes:448078}  {kkproceduremedstudent:801718}    Follow-up: {kkfollowup:685995}    Staff and Medical Student:     ***    ***  ____________________________________________    CC: Derm Problem (Dana is here for hidradenitis suppurativa evaluation. )    HPI:  Ms. Dana Hernandez is a(n) 37 year old female who presents today {kknew/return:059863} for ***    Patient is otherwise feeling well, without additional skin concerns.    Labs:  {kkreviewedlabs:330031} reviewed.    Physical Exam:  Vitals: There were no vitals taken for this visit.  SKIN: {kkSkinExam:979368}  - ***  - ***  - ***  - No other lesions of concern on areas examined.     Medications:  Current Outpatient Medications   Medication     albuterol (PROAIR HFA/PROVENTIL HFA/VENTOLIN HFA) 108 (90 BASE) MCG/ACT Inhaler     HYDROcodone-acetaminophen (NORCO) 5-325 MG per tablet     hydrOXYzine (VISTARIL) 25 MG capsule     mometasone-formoterol (DULERA) 100-5 MCG/ACT oral inhaler     ondansetron (ZOFRAN-ODT) 4 MG ODT tab     oxyCODONE-acetaminophen (PERCOCET) 5-325 MG per tablet     No current facility-administered medications for this visit.      Past Medical History:   There is no problem list on file for this patient.    Past Medical History:   Diagnosis Date     Uncomplicated asthma         CC Greg Ny, DO  [address]  [place] [address] on close of this encounter.

## 2023-07-13 ENCOUNTER — MYC MEDICAL ADVICE (OUTPATIENT)
Dept: DERMATOLOGY | Facility: CLINIC | Age: 38
End: 2023-07-13
Payer: COMMERCIAL

## 2023-07-13 DIAGNOSIS — D84.9 IMMUNOSUPPRESSION (H): Primary | ICD-10-CM

## 2023-07-13 DIAGNOSIS — L73.2 HIDRADENITIS SUPPURATIVA: ICD-10-CM

## 2023-07-15 ENCOUNTER — HEALTH MAINTENANCE LETTER (OUTPATIENT)
Age: 38
End: 2023-07-15

## 2023-07-19 ENCOUNTER — LAB (OUTPATIENT)
Dept: LAB | Facility: CLINIC | Age: 38
End: 2023-07-19
Payer: COMMERCIAL

## 2023-07-19 ENCOUNTER — OFFICE VISIT (OUTPATIENT)
Dept: DERMATOLOGY | Facility: CLINIC | Age: 38
End: 2023-07-19
Attending: DERMATOLOGY
Payer: COMMERCIAL

## 2023-07-19 DIAGNOSIS — L73.2 HIDRADENITIS SUPPURATIVA: ICD-10-CM

## 2023-07-19 DIAGNOSIS — Z51.81 MEDICATION MONITORING ENCOUNTER: ICD-10-CM

## 2023-07-19 DIAGNOSIS — Z51.81 MEDICATION MONITORING ENCOUNTER: Primary | ICD-10-CM

## 2023-07-19 LAB
BUN SERPL-MCNC: 11.2 MG/DL (ref 6–20)
CREAT SERPL-MCNC: 0.8 MG/DL (ref 0.51–0.95)
GFR SERPL CREATININE-BSD FRML MDRD: >90 ML/MIN/1.73M2

## 2023-07-19 PROCEDURE — 99214 OFFICE O/P EST MOD 30 MIN: CPT | Performed by: DERMATOLOGY

## 2023-07-19 PROCEDURE — 84520 ASSAY OF UREA NITROGEN: CPT | Performed by: PATHOLOGY

## 2023-07-19 PROCEDURE — 82565 ASSAY OF CREATININE: CPT | Performed by: PATHOLOGY

## 2023-07-19 PROCEDURE — 36415 COLL VENOUS BLD VENIPUNCTURE: CPT | Performed by: PATHOLOGY

## 2023-07-19 ASSESSMENT — PAIN SCALES - GENERAL: PAINLEVEL: NO PAIN (0)

## 2023-07-19 NOTE — LETTER
"7/19/2023       RE: Dana Hernandez  [address] [address]     Dear Colleague,    Thank you for referring your patient, Dana Hernandez, to the [facility] DERMATOLOG[facility] [place] at [facility]. Please see a copy of my visit note below.    [facility] Dermatology Note  Encounter Date: Jul 19, 2023  Office Visit       Dermatology Problem List:  1. Hidradenitis suppurativa: early Del Cid II affecting pubis/groin > axillae  - Current: chlorhexidine, clindamycin, spironolactone 100 mg at bedtime, OCPs  - Rrior: doxycycline  2. Recurrent right axillary subcutaneous nodule: possibly related to hidradenitis suppurativa  - Rrior imaging nonspecific     ______________________________________     Impression/Plan:  1. Hidradenitis suppurativa: early Del Cid II affecting pubis/groin > axillae- is on junaid. Happy with this and wants to add laser hair removal.  She will continue junaid and check labs   - spironolactone 100 mg okay to go to am - we will check BUN and creatinine.   - \"chlorhexidine, clindamycin solution\"-pt wants to continue no change from Dr. Kuo  - referral for laser hair removal  -future from Dr. Kuo \"including topicals, systemic antibiotics, spironolactone, metformin, adalimumab, LHR.\"  -humira (has ankylosing spondylitis she reports)    - The nature and purpose of the procedure, associated risks, possible consequences, complications and alternative methods of treatment were explained in detail, this includes but is not limited to hyperpigmentation, hypopigmentation, scarring, bruising, hair loss pain/discomfort, eye injury, paradoxical hair growth, hives, infection, itching  and blister.  We reviewed that eye protection must be worn and cannot be removed during the procedure.  We reviewed that the outcome could be any of the following: no improvement, slight improvement or change in skin color & texture, the skin " might be permanently lighter or darker, and though uncommon, superficial scarring may occur.  Multiple treatments are required. Typically 3-6 treatments are needed to see disease improvement but improvement is not guaranteed.  The patient understood that  the treatment may not work and that this is not an FDA approved treatment for hidradenitis and is not a first line treatment. The use of numbing topicals can help with pain, but does not eliminate pain. We reviewed with patients that laser hair removal is a reduction in hair and not permanent.   Reviewed that the patient can terminate the procedure at anytime.         We review that the patient will have numbing with topical cream prior to the procedures unless this is declined. Patient would like to move forward with this medication.   Pt notified 3 attempts and then we will remove of list of no answer    Numbing checklist:  Does the patient have allergy to numbing medication (lidocaine/benzocaine/tetracaine): yes/no: No  Is the patient pregnant? yes/no: No  Does the patient report a new skin lesion or rash in the area to be numbed? yes/no: No  Is the patient under the age of a18? yes/no: No  Is the patient less than 75kg  in weight? yes/no: weight is okay for LMX application  Patient was counseled risks of toxicity on this medication including neurologic, cardia.     Medication administration:  We will plan for LMX at axilla and mons pubis/folds.     Scheduling: The patient was notified that 3 attempts will be made to contact them and if no response they will move to the back of our wait list. After 1 year, the consult will need to be repeated.              #history of tanning bed history  -schedule with Dr. Kuo within 1 year             Procedures Performed:   NA    Follow-up:  For HS laser, moved to wait list.     Staff and Scribe:     Francisco Javier OROZCO, am serving as a scribe to document services personally performed by Rosalia Rome MD based on data  collection and the provider's statements to me.    Provider Disclosure:   The documentation recorded by the scribe accurately reflects the services I personally performed and the decisions made by me.    Rosalia Rome MD    Department of Dermatology  [facility]: Phone: [phone], Fax:[phone]  [facility]: Phone: [phone], Fax: [phone]    ____________________________________________    CC: Derm Problem (Dana is here for a laser hair removal consult. )    HPI:  Ms. Dana Hernandez is a(n) 38 year old female who presents today as a new patient for LHR consult for HS. Last seen and referred June 26, 2023 by Dr. Kuo.     Today, reports HS. Has seen private practive for quote.     Want sto treat the axilla and also flares on mons pubis and groin    Is on spironolactone and no side effects and feels well mostly but some nauseau    No history of hair removal  Patient is otherwise feeling well, without additional skin concerns.    Labs Reviewed:  NA  Physical Exam:  Vitals: There were no vitals taken for this visit.  SKIN:     Normal axilla  -scarring and comedones on mons pubis.   - No other lesions of concern on areas examined.     Medications:  Current Outpatient Medications   Medication    albuterol (PROAIR HFA/PROVENTIL HFA/VENTOLIN HFA) 108 (90 BASE) MCG/ACT Inhaler    amphetamine-dextroamphetamine (ADDERALL XR) 25 MG 24 hr capsule    chlorhexidine (HIBICLENS) 4 % liquid    clindamycin (CLEOCIN T) 1 % external solution    mometasone-formoterol (DULERA) 100-5 MCG/ACT oral inhaler    spironolactone (ALDACTONE) 100 MG tablet    HYDROcodone-acetaminophen (NORCO) 5-325 MG per tablet    hydrOXYzine (VISTARIL) 25 MG capsule    ondansetron (ZOFRAN-ODT) 4 MG ODT tab    oxyCODONE-acetaminophen (PERCOCET) 5-325 MG per tablet     No current facility-administered medications for this  visit.      Past Medical History:   There is no problem list on file for this patient.    Past Medical History:   Diagnosis Date    Uncomplicated asthma         CC Unruly Kuo MD  [address] [place] [address] on close of this encounter.

## 2023-07-19 NOTE — NURSING NOTE
Dermatology Rooming Note    Dana Hernandez's goals for this visit include:   Chief Complaint   Patient presents with     Derm Problem     Dana is here for a laser hair removal consult.      Ginny Carmona, visit facilitator

## 2023-07-19 NOTE — PROGRESS NOTES
"[facility] Dermatology Note  Encounter Date: Jul 19, 2023  Office Visit       Dermatology Problem List:  1. Hidradenitis suppurativa: early Del Cid II affecting pubis/groin > axillae  - Current: chlorhexidine, clindamycin, spironolactone 100 mg at bedtime, OCPs  - Rrior: doxycycline  2. Recurrent right axillary subcutaneous nodule: possibly related to hidradenitis suppurativa  - Rrior imaging nonspecific     ______________________________________     Impression/Plan:  1. Hidradenitis suppurativa: early Del Cid II affecting pubis/groin > axillae- is on junaid. Happy with this and wants to add laser hair removal.  She will continue junaid and check labs   - spironolactone 100 mg okay to go to am - we will check BUN and creatinine.   - \"chlorhexidine, clindamycin solution\"-pt wants to continue no change from Dr. Kuo  - referral for laser hair removal  -future from Dr. Kuo \"including topicals, systemic antibiotics, spironolactone, metformin, adalimumab, LHR.\"  -humira (has ankylosing spondylitis she reports)    - The nature and purpose of the procedure, associated risks, possible consequences, complications and alternative methods of treatment were explained in detail, this includes but is not limited to hyperpigmentation, hypopigmentation, scarring, bruising, hair loss pain/discomfort, eye injury, paradoxical hair growth, hives, infection, itching  and blister.  We reviewed that eye protection must be worn and cannot be removed during the procedure.  We reviewed that the outcome could be any of the following: no improvement, slight improvement or change in skin color & texture, the skin might be permanently lighter or darker, and though uncommon, superficial scarring may occur.  Multiple treatments are required. Typically 3-6 treatments are needed to see disease improvement but improvement is not guaranteed.  The patient understood that  the treatment may not work and that this is not an FDA " approved treatment for hidradenitis and is not a first line treatment. The use of numbing topicals can help with pain, but does not eliminate pain. We reviewed with patients that laser hair removal is a reduction in hair and not permanent.   Reviewed that the patient can terminate the procedure at anytime.         We review that the patient will have numbing with topical cream prior to the procedures unless this is declined. Patient would like to move forward with this medication.   Pt notified 3 attempts and then we will remove of list of no answer    Numbing checklist:  Does the patient have allergy to numbing medication (lidocaine/benzocaine/tetracaine): yes/no: No  Is the patient pregnant? yes/no: No  Does the patient report a new skin lesion or rash in the area to be numbed? yes/no: No  Is the patient under the age of a18? yes/no: No  Is the patient less than 75kg  in weight? yes/no: weight is okay for LMX application  Patient was counseled risks of toxicity on this medication including neurologic, cardia.     Medication administration:  We will plan for LMX at axilla and mons pubis/folds.     Scheduling: The patient was notified that 3 attempts will be made to contact them and if no response they will move to the back of our wait list. After 1 year, the consult will need to be repeated.              #history of tanning bed history  -schedule with Dr. Kuo within 1 year             Procedures Performed:   NA    Follow-up:  For HS laser, moved to wait list.     Staff and Scribe:     I, Francisco Javier Lemus, am serving as a scribe to document services personally performed by Rosalia Rome MD based on data collection and the provider's statements to me.    Provider Disclosure:   The documentation recorded by the scribe accurately reflects the services I personally performed and the decisions made by me.    Rosalia Rome MD    Department of Dermatology  [facility]  [facility]: Phone: [phone], Fax:[phone]  [facility]: Phone: [phone], Fax: [phone]    ____________________________________________    CC: Derm Problem (Dana is here for a laser hair removal consult. )    HPI:  Ms. Dana Hernandez is a(n) 38 year old female who presents today as a new patient for LHR consult for HS. Last seen and referred June 26, 2023 by Dr. Kuo.     Today, reports HS. Has seen private practive for quote.     Want sto treat the axilla and also flares on mons pubis and groin    Is on spironolactone and no side effects and feels well mostly but some nauseau    No history of hair removal  Patient is otherwise feeling well, without additional skin concerns.    Labs Reviewed:  NA  Physical Exam:  Vitals: There were no vitals taken for this visit.  SKIN:     Normal axilla  -scarring and comedones on mons pubis.   - No other lesions of concern on areas examined.     Medications:  Current Outpatient Medications   Medication     albuterol (PROAIR HFA/PROVENTIL HFA/VENTOLIN HFA) 108 (90 BASE) MCG/ACT Inhaler     amphetamine-dextroamphetamine (ADDERALL XR) 25 MG 24 hr capsule     chlorhexidine (HIBICLENS) 4 % liquid     clindamycin (CLEOCIN T) 1 % external solution     mometasone-formoterol (DULERA) 100-5 MCG/ACT oral inhaler     spironolactone (ALDACTONE) 100 MG tablet     HYDROcodone-acetaminophen (NORCO) 5-325 MG per tablet     hydrOXYzine (VISTARIL) 25 MG capsule     ondansetron (ZOFRAN-ODT) 4 MG ODT tab     oxyCODONE-acetaminophen (PERCOCET) 5-325 MG per tablet     No current facility-administered medications for this visit.      Past Medical History:   There is no problem list on file for this patient.    Past Medical History:   Diagnosis Date     Uncomplicated asthma         CC Unruly Kuo MD  [address] [place] [address] on close of this encounter.

## 2023-07-20 ENCOUNTER — TELEPHONE (OUTPATIENT)
Dept: DERMATOLOGY | Facility: CLINIC | Age: 38
End: 2023-07-20
Payer: COMMERCIAL

## 2023-07-27 ENCOUNTER — TELEPHONE (OUTPATIENT)
Dept: DERMATOLOGY | Facility: CLINIC | Age: 38
End: 2023-07-27
Payer: COMMERCIAL

## 2023-07-27 NOTE — TELEPHONE ENCOUNTER
PA Approved. MyC message sent to patient with update.  Prescription released to [facility]    Prior Authorization Approval    Medication: HUMIRA *CF* PEN 40 MG/0.4ML SC PNKT  Authorization Effective Date: 6/27/2023  Authorization Expiration Date: 10/25/2023  Approved Dose/Quantity: Loading dose and Maintenance  Reference #: [id number]   Insurance Company: [employer] - Phone [phone] Fax [phone]  Expected CoPay: 3.00     CoPay Card Available:      Which Pharmacy is filling the prescription: [facility] - [place], [place] - [address] [place] [address] [facility]  Pharmacy Notified:  yes  Patient Notified:   yes      Dana Hernandez Pavon: [id number]   Vacuum Extraction was not used

## 2023-07-27 NOTE — TELEPHONE ENCOUNTER
PA Initiation    Medication: HUMIRA *CF* PEN 40 MG/0.4ML SC PNKT  Insurance Company: [employer] - Phone [phone] Fax [phone]  Pharmacy Filling the Rx: [facility] - [place], [place] - [address]  Filling Pharmacy Phone:    Filling Pharmacy Fax:    Start Date: 7/27/2023

## 2023-07-28 NOTE — TELEPHONE ENCOUNTER
Starter dose and bi-weekly dosing approved.  Will need to submit appeal for weekly maintenance dosing.

## 2023-08-07 ENCOUNTER — MYC MEDICAL ADVICE (OUTPATIENT)
Dept: DERMATOLOGY | Facility: CLINIC | Age: 38
End: 2023-08-07
Payer: COMMERCIAL

## 2023-08-11 ENCOUNTER — MYC MEDICAL ADVICE (OUTPATIENT)
Dept: DERMATOLOGY | Facility: CLINIC | Age: 38
End: 2023-08-11
Payer: COMMERCIAL

## 2023-08-11 DIAGNOSIS — R59.1 LYMPHADENOPATHY: Primary | ICD-10-CM

## 2023-08-21 ENCOUNTER — PHARMACY VISIT (OUTPATIENT)
Dept: PHARMACY | Facility: CLINIC | Age: 38
End: 2023-08-21
Payer: COMMERCIAL

## 2023-08-21 NOTE — PROGRESS NOTES
Hidradenitis Suppurativa Clinical Follow Up Assessment      Activity Medications    HUMIRA        Care Details    What are the patients' goals for this therapy?   ? To reduce her pain.      Is patient meeting treatment goals?   ? Progressing toward goal      Approximately how long has the patient been using this therapy?   ? 1 week      Please select CURRENT side effect(s) for monitoring:   ? None       Summary Notes  Spoke with Dana for assessment. Current Regimen: Humira HS loading doses     Dosing Schedule: First dose: took the first two pens 8/15. Has the third pen for 8/29 then will be starting weekly dosing on 9/12. Pt is using a calendar. Discussed how to refill and use texts.     Tolerability: Denies SE/ISR. No issues at all.     HS: Reviewed time to benefit. Pt is possibly seeing some small improvements in symptoms but thinks it's probably just coincidence at this point and doesnt want to jinx it. She is looking forward to seeing how she does in the next week or so since that is when her symptoms flare up the worst in relation to her cycle/birth control pack.     Goals of Therapy: Reduce pain     Follow up: 1 month       Olamide Butcher, PharmD  Therapy Management Pharmacist  [facility]

## 2023-08-22 ENCOUNTER — TELEPHONE (OUTPATIENT)
Dept: DERMATOLOGY | Facility: CLINIC | Age: 38
End: 2023-08-22
Payer: COMMERCIAL

## 2023-08-22 NOTE — TELEPHONE ENCOUNTER
PA Needed    Medication: HUMIRA-QTY PA NEEDED  QTY/DS: 4 PER 28 DAYS  NEW INS: NO  Insurance Company:  [facility]  Pharmacy Filling the Rx:  [facility]  PA :  N/A  Date of last fill:    **QTY PA IS NEEDED FOR THE MAINTENANCE DOSE

## 2023-08-23 NOTE — TELEPHONE ENCOUNTER
PA Initiation    Medication: HUMIRA *CF* PEN 40 MG/0.4ML SC PNKT  Insurance Company: [facility] - Phone [phone] Fax [phone]  Pharmacy Filling the Rx: [facility] - [place], [place] - [facility] [address]  Filling Pharmacy Phone: [phone]  Filling Pharmacy Fax:    Start Date: 8/23/2023

## 2023-08-23 NOTE — TELEPHONE ENCOUNTER
Prior Authorization Approval    Medication: HUMIRA *CF* PEN 40 MG/0.4ML SC PNKT  Authorization Effective Date: 7/24/2023  Authorization Expiration Date: 8/22/2024  Approved Dose/Quantity: 4 per 28 days  Reference #: [id number]   Insurance Company: [employer] - Phone [phone] Fax [phone]  Expected CoPay: $3.00     CoPay Card Available: No    Financial Assistance Needed: no  [facility] the prescription[facility] - [place], [place] - [address]  Pharmacy Notified: Yes  Patient Notified: No

## 2023-09-05 ENCOUNTER — ANCILLARY ORDERS (OUTPATIENT)
Dept: MAMMOGRAPHY | Facility: CLINIC | Age: 38
End: 2023-09-05

## 2023-09-05 DIAGNOSIS — R59.1 LYMPHADENOPATHY: Primary | ICD-10-CM

## 2023-09-27 ENCOUNTER — PHARMACY VISIT (OUTPATIENT)
Dept: PHARMACY | Facility: CLINIC | Age: 38
End: 2023-09-27
Payer: COMMERCIAL

## 2023-09-27 NOTE — PROGRESS NOTES
Hidradenitis Suppurativa Clinical Follow Up Assessment      Activity Medications    HUMIRA       Summary Notes  Spoke with Dana for assessment. Current Regimen: Humira 40mg every week     Med/Allergy Changes: None     Adherence: No concerns. Pt has been taking weekly.     Tolerability: Denies SE/ISR.     HS: Initially seemed like it was helping, but now on the maintenance dose it seems like it is getting back to where it was before the Humira. Under her arms were totally clear, but now it seems like its flaring worse than before. Normally it flares about 1 wk before her period but now it is 2 wks before. Discussed that it is still early for Humira efficacy since she has only been on the Humira for about 1.5 months. She is seeing derm at the end of October so that will be a good time to further assess efficacy and make changes if needed.     Follow up: 2 months    Olamide Butcher PharmD  Therapy Management Pharmacist  [facility]

## 2023-10-09 ENCOUNTER — TELEPHONE (OUTPATIENT)
Dept: DERMATOLOGY | Facility: CLINIC | Age: 38
End: 2023-10-09
Payer: COMMERCIAL

## 2023-10-09 NOTE — TELEPHONE ENCOUNTER
[facility] Call Center    Phone Message    May a detailed message be left on voicemail: yes, [phone]      Reason for Call: Symptoms or Concerns     If patient has red-flag symptoms, warm transfer to triage line    Current symptom or concern: ED Visit on [facility] this weekend, Diagnosis:Cellulitis on breast and was prescribed Doxycycline 100mg 2x a day    Symptoms have been present for:  2 day(s)    Has patient previously been seen for this? Yes    By : ED Visit - [facility]    Date: 10/07/2023    Are there any new or worsening symptoms? No  Patient would like to discuss medication given, she would also like to know if she needs to continue Humira.  Please call patient back to discuss as soon as possible.    Action Taken: Message routed to:  [facility] ([facility]): [facility]     Travel Screening: Not Applicable

## 2023-10-13 ENCOUNTER — VIRTUAL VISIT (OUTPATIENT)
Dept: DERMATOLOGY | Facility: CLINIC | Age: 38
End: 2023-10-13
Payer: COMMERCIAL

## 2023-10-13 ENCOUNTER — TELEPHONE (OUTPATIENT)
Dept: DERMATOLOGY | Facility: CLINIC | Age: 38
End: 2023-10-13

## 2023-10-13 VITALS — HEIGHT: 64 IN | BODY MASS INDEX: 23.05 KG/M2 | WEIGHT: 135 LBS

## 2023-10-13 DIAGNOSIS — L30.9 DERMATITIS: Primary | ICD-10-CM

## 2023-10-13 PROCEDURE — 99442 PR PHYSICIAN TELEPHONE EVALUATION 11-20 MIN: CPT | Mod: 93 | Performed by: DERMATOLOGY

## 2023-10-13 RX ORDER — TRIAMCINOLONE ACETONIDE 1 MG/G
CREAM TOPICAL 2 TIMES DAILY
Qty: 80 G | Refills: 3 | Status: SHIPPED | OUTPATIENT
Start: 2023-10-13 | End: 2024-07-09

## 2023-10-13 ASSESSMENT — PAIN SCALES - GENERAL: PAINLEVEL: NO PAIN (0)

## 2023-10-13 NOTE — LETTER
10/13/2023       RE: Dana Hernandez  [address] [facility] [address]     Dear Colleague,    Thank you for referring your patient, Dana Hernandez, to the [facility] [place] [facility]. Please see a copy of my visit note below.    [facility] Dermatology Note  Encounter Date: Oct 13, 2023  Store-and-Forward and Telephone ([phone]). Location of teledermatologist: [facility] [place].  Start time: 9:13. End time: 9:28.    Dermatology Problem List:  1. Hidradenitis suppurativa: early Del Cid II affecting pubis/groin > axillae  - current: chlorhexidine, clindamycin, spironolactone 100 mg at bedtime, OCPs  - referral for LHR  - prior: doxycycline  2. Recurrent right axillary subcutaneous nodule: possibly related to hidradenitis suppurativa  - prior imaging nonspecific    ____________________________________________    Assessment & Plan:     Erythematous patch on the right breast: appears eczematous more than cellulitis; low suspicion for inflammatory breast cancer. Does not appear consistent with hidradenitis suppurativa. We discussed completing course of doxycycline, restarting adalimumab, but will also start topical triamcinolone to the affected area. Has follow up in 2 weeks; pending response to treatment, consider skin biopsy +/- ultrasound. DDx also includes early morphea.  - finish doxycycline as prescribed  - restart adalimumab  - triamcinolone cream BID      Procedures Performed:    None    Follow-up: 2 weeks    Staff:     Unruly Kuo MD, FAAD   of Dermatology  Department of Dermatology  [facility]\    ____________________________________________    CC: RECHECK    HPI:  Ms. Dana Hernandez is a(n) 38 year old female who presents today as a return patient for HS    Hidradenitis in groin/pubic area well-controlled  - using  "clindamycin    Right breast, underarm - no focal lesions  - ?cellulitis - first occurrence   - not really painful - a little more itchy   - on doxycycline  - if use right arm too much, becomes very sore  - in last 2 weeks, has been flaring worse  - on adalimumab - skipped dose this past Monday in light of cellulitis  - skin feels tight underneath arm    Patient is otherwise feeling well, without additional skin concerns.    Labs Reviewed:  N/A    Physical Exam:  Vitals: Ht 1.626 m (5' 4\")   Wt 61.2 kg (135 lb)   BMI 23.17 kg/m    SKIN: Teledermatology photos were reviewed; image quality and interpretability: acceptable. Image date: 10/12/23.  - erythematous patch on the right breast  - No other lesions of concern on areas examined.     Medications:  Current Outpatient Medications   Medication    adalimumab (HUMIRA *CF*) 40 MG/0.4ML pen kit    adalimumab (HUMIRA *CF*) 80 MG/0.8ML pen kit    albuterol (PROAIR HFA/PROVENTIL HFA/VENTOLIN HFA) 108 (90 BASE) MCG/ACT Inhaler    amphetamine-dextroamphetamine (ADDERALL XR) 25 MG 24 hr capsule    chlorhexidine (HIBICLENS) 4 % liquid    clindamycin (CLEOCIN T) 1 % external solution    HYDROcodone-acetaminophen (NORCO) 5-325 MG per tablet    hydrOXYzine (VISTARIL) 25 MG capsule    mometasone-formoterol (DULERA) 100-5 MCG/ACT oral inhaler    ondansetron (ZOFRAN-ODT) 4 MG ODT tab    oxyCODONE-acetaminophen (PERCOCET) 5-325 MG per tablet    spironolactone (ALDACTONE) 100 MG tablet     No current facility-administered medications for this visit.      Past Medical/Surgical History:   There is no problem list on file for this patient.    Past Medical History:   Diagnosis Date    Uncomplicated asthma        CC No referring provider defined for this encounter. on close of this encounter.    "

## 2023-10-13 NOTE — TELEPHONE ENCOUNTER
PA Needed    Medication: Humira  QTY/DS:4 per 28 days  NEW INS:[employer]  Insurance Company:  [employer]  Pharmacy Filling the Rx:    PA :    Date of last fill: 2023

## 2023-10-13 NOTE — PROGRESS NOTES
[facility] Dermatology Note  Encounter Date: Oct 13, 2023  Store-and-Forward and Telephone ([phone]). Location of teledermatologist: [facility] [place].  Start time: 9:13. End time: 9:28.    Dermatology Problem List:  1. Hidradenitis suppurativa: early Del Cid II affecting pubis/groin > axillae  - current: chlorhexidine, clindamycin, spironolactone 100 mg at bedtime, OCPs  - referral for LHR  - prior: doxycycline  2. Recurrent right axillary subcutaneous nodule: possibly related to hidradenitis suppurativa  - prior imaging nonspecific    ____________________________________________    Assessment & Plan:     Erythematous patch on the right breast: appears eczematous more than cellulitis; low suspicion for inflammatory breast cancer. Does not appear consistent with hidradenitis suppurativa. We discussed completing course of doxycycline, restarting adalimumab, but will also start topical triamcinolone to the affected area. Has follow up in 2 weeks; pending response to treatment, consider skin biopsy +/- ultrasound. DDx also includes early morphea.  - finish doxycycline as prescribed  - restart adalimumab  - triamcinolone cream BID      Procedures Performed:    None    Follow-up: 2 weeks    Staff:     Unruly Kuo MD, FAAD   of Dermatology  Department of Dermatology  [facility]\    ____________________________________________    CC: RECHECK    HPI:  Ms. Dana Hernandez is a(n) 38 year old female who presents today as a return patient for HS    Hidradenitis in groin/pubic area well-controlled  - using clindamycin    Right breast, underarm - no focal lesions  - ?cellulitis - first occurrence   - not really painful - a little more itchy   - on doxycycline  - if use right arm too much, becomes very sore  - in last 2 weeks, has been flaring worse  - on adalimumab - skipped dose this past Monday in light of  "cellulitis  - skin feels tight underneath arm    Patient is otherwise feeling well, without additional skin concerns.    Labs Reviewed:  N/A    Physical Exam:  Vitals: Ht 1.626 m (5' 4\")   Wt 61.2 kg (135 lb)   BMI 23.17 kg/m    SKIN: Teledermatology photos were reviewed; image quality and interpretability: acceptable. Image date: 10/12/23.  - erythematous patch on the right breast  - No other lesions of concern on areas examined.     Medications:  Current Outpatient Medications   Medication    adalimumab (HUMIRA *CF*) 40 MG/0.4ML pen kit    adalimumab (HUMIRA *CF*) 80 MG/0.8ML pen kit    albuterol (PROAIR HFA/PROVENTIL HFA/VENTOLIN HFA) 108 (90 BASE) MCG/ACT Inhaler    amphetamine-dextroamphetamine (ADDERALL XR) 25 MG 24 hr capsule    chlorhexidine (HIBICLENS) 4 % liquid    clindamycin (CLEOCIN T) 1 % external solution    HYDROcodone-acetaminophen (NORCO) 5-325 MG per tablet    hydrOXYzine (VISTARIL) 25 MG capsule    mometasone-formoterol (DULERA) 100-5 MCG/ACT oral inhaler    ondansetron (ZOFRAN-ODT) 4 MG ODT tab    oxyCODONE-acetaminophen (PERCOCET) 5-325 MG per tablet    spironolactone (ALDACTONE) 100 MG tablet     No current facility-administered medications for this visit.      Past Medical/Surgical History:   There is no problem list on file for this patient.    Past Medical History:   Diagnosis Date    Uncomplicated asthma        CC No referring provider defined for this encounter. on close of this encounter.  "

## 2023-10-13 NOTE — NURSING NOTE
Teledermatology Nurse Call Patients:     Are you in the [facility] at the time of the encounter? yes    Today's visit will be billed to you and your insurance.    A teledermatology visit is not as thorough as an in-person visit and the quality of the photograph sent may not be of the same quality as that taken by the dermatology clinic.    Chief Complaint   Patient presents with    RECHECK

## 2023-10-13 NOTE — TELEPHONE ENCOUNTER
PA Initiation    Medication: HUMIRA *CF* PEN 40 MG/0.4ML SC PNKT  Insurance Company: [employer] - Phone [phone] Fax [phone]  Pharmacy Filling the Rx[facility] - [address] [place] [address]  Filling Pharmacy Phone:    Filling Pharmacy Fax:    Start Date: 10/13/2023      Key: [id number]

## 2023-10-16 NOTE — TELEPHONE ENCOUNTER
Prior Authorization Approval    Medication: HUMIRA *CF* PEN 40 MG/0.4ML SC PNKT  Authorization Effective Date: 9/13/2023  Authorization Expiration Date: 10/12/2024  Approved Dose/Quantity: 4 per 28 days  Reference #: Key: [id number]   Insurance Company: [employer] - Phone [phone] Fax [phone]  Expected CoPay: $    CoPay Card Available:      Financial Assistance Needed: no  Which Pharmacy is filling the prescription: [facility] [address]  Pharmacy Notified: yes  Patient Notified: no

## 2023-10-27 ENCOUNTER — LAB (OUTPATIENT)
Dept: LAB | Facility: CLINIC | Age: 38
End: 2023-10-27
Payer: COMMERCIAL

## 2023-10-27 ENCOUNTER — OFFICE VISIT (OUTPATIENT)
Dept: DERMATOLOGY | Facility: CLINIC | Age: 38
End: 2023-10-27
Payer: COMMERCIAL

## 2023-10-27 DIAGNOSIS — L73.2 HIDRADENITIS SUPPURATIVA: Primary | ICD-10-CM

## 2023-10-27 DIAGNOSIS — L30.9 DERMATITIS: ICD-10-CM

## 2023-10-27 DIAGNOSIS — D84.9 IMMUNOSUPPRESSION (H): ICD-10-CM

## 2023-10-27 DIAGNOSIS — R23.1 LIVEDO RETICULARIS: ICD-10-CM

## 2023-10-27 LAB
ALBUMIN SERPL BCG-MCNC: 4.5 G/DL (ref 3.5–5.2)
ALP SERPL-CCNC: 31 U/L (ref 35–104)
ALT SERPL W P-5'-P-CCNC: 19 U/L (ref 0–50)
ANION GAP SERPL CALCULATED.3IONS-SCNC: 9 MMOL/L (ref 7–15)
AST SERPL W P-5'-P-CCNC: 21 U/L (ref 0–45)
BASOPHILS # BLD AUTO: 0 10E3/UL (ref 0–0.2)
BASOPHILS NFR BLD AUTO: 1 %
BILIRUB SERPL-MCNC: 0.2 MG/DL
BUN SERPL-MCNC: 13.5 MG/DL (ref 6–20)
CALCIUM SERPL-MCNC: 9.1 MG/DL (ref 8.6–10)
CHLORIDE SERPL-SCNC: 106 MMOL/L (ref 98–107)
CREAT SERPL-MCNC: 0.75 MG/DL (ref 0.51–0.95)
DEPRECATED HCO3 PLAS-SCNC: 24 MMOL/L (ref 22–29)
EGFRCR SERPLBLD CKD-EPI 2021: >90 ML/MIN/1.73M2
EOSINOPHIL # BLD AUTO: 0.2 10E3/UL (ref 0–0.7)
EOSINOPHIL NFR BLD AUTO: 4 %
ERYTHROCYTE [DISTWIDTH] IN BLOOD BY AUTOMATED COUNT: 12.4 % (ref 10–15)
FACTOR V INTERPRETATION: NORMAL
GLUCOSE SERPL-MCNC: 118 MG/DL (ref 70–99)
HCT VFR BLD AUTO: 39.9 % (ref 35–47)
HGB BLD-MCNC: 13.2 G/DL (ref 11.7–15.7)
IMM GRANULOCYTES # BLD: 0 10E3/UL
IMM GRANULOCYTES NFR BLD: 0 %
LAB DIRECTOR COMMENTS: NORMAL
LAB DIRECTOR DISCLAIMER: NORMAL
LAB DIRECTOR INTERPRETATION: NORMAL
LAB DIRECTOR METHODOLOGY: NORMAL
LAB DIRECTOR RESULTS: NORMAL
LYMPHOCYTES # BLD AUTO: 1.4 10E3/UL (ref 0.8–5.3)
LYMPHOCYTES NFR BLD AUTO: 30 %
MCH RBC QN AUTO: 28.3 PG (ref 26.5–33)
MCHC RBC AUTO-ENTMCNC: 33.1 G/DL (ref 31.5–36.5)
MCV RBC AUTO: 86 FL (ref 78–100)
MONOCYTES # BLD AUTO: 0.2 10E3/UL (ref 0–1.3)
MONOCYTES NFR BLD AUTO: 3 %
NEUTROPHILS # BLD AUTO: 2.8 10E3/UL (ref 1.6–8.3)
NEUTROPHILS NFR BLD AUTO: 62 %
NRBC # BLD AUTO: 0 10E3/UL
NRBC BLD AUTO-RTO: 0 /100
PLATELET # BLD AUTO: 198 10E3/UL (ref 150–450)
POTASSIUM SERPL-SCNC: 3.7 MMOL/L (ref 3.4–5.3)
PROT SERPL-MCNC: 7 G/DL (ref 6.4–8.3)
RBC # BLD AUTO: 4.66 10E6/UL (ref 3.8–5.2)
SODIUM SERPL-SCNC: 139 MMOL/L (ref 135–145)
SPECIMEN DESCRIPTION: NORMAL
WBC # BLD AUTO: 4.6 10E3/UL (ref 4–11)

## 2023-10-27 PROCEDURE — 85390 FIBRINOLYSINS SCREEN I&R: CPT | Mod: 26 | Performed by: PATHOLOGY

## 2023-10-27 PROCEDURE — 80053 COMPREHEN METABOLIC PANEL: CPT | Performed by: PATHOLOGY

## 2023-10-27 PROCEDURE — 82595 ASSAY OF CRYOGLOBULIN: CPT | Performed by: DERMATOLOGY

## 2023-10-27 PROCEDURE — 86146 BETA-2 GLYCOPROTEIN ANTIBODY: CPT | Performed by: DERMATOLOGY

## 2023-10-27 PROCEDURE — 85306 CLOT INHIBIT PROT S FREE: CPT | Performed by: DERMATOLOGY

## 2023-10-27 PROCEDURE — G0452 MOLECULAR PATHOLOGY INTERPR: HCPCS | Mod: 26 | Performed by: PATHOLOGY

## 2023-10-27 PROCEDURE — 36415 COLL VENOUS BLD VENIPUNCTURE: CPT | Performed by: PATHOLOGY

## 2023-10-27 PROCEDURE — 86147 CARDIOLIPIN ANTIBODY EA IG: CPT | Performed by: DERMATOLOGY

## 2023-10-27 PROCEDURE — 86038 ANTINUCLEAR ANTIBODIES: CPT | Performed by: DERMATOLOGY

## 2023-10-27 PROCEDURE — 85025 COMPLETE CBC W/AUTO DIFF WBC: CPT | Performed by: PATHOLOGY

## 2023-10-27 PROCEDURE — 85730 THROMBOPLASTIN TIME PARTIAL: CPT | Performed by: DERMATOLOGY

## 2023-10-27 PROCEDURE — 86481 TB AG RESPONSE T-CELL SUSP: CPT | Performed by: DERMATOLOGY

## 2023-10-27 PROCEDURE — 99000 SPECIMEN HANDLING OFFICE-LAB: CPT | Performed by: PATHOLOGY

## 2023-10-27 PROCEDURE — 81241 F5 GENE: CPT | Performed by: DERMATOLOGY

## 2023-10-27 PROCEDURE — 99214 OFFICE O/P EST MOD 30 MIN: CPT | Mod: GC | Performed by: DERMATOLOGY

## 2023-10-27 PROCEDURE — 85303 CLOT INHIBIT PROT C ACTIVITY: CPT | Performed by: DERMATOLOGY

## 2023-10-27 RX ORDER — DOXYCYCLINE 100 MG/1
100 CAPSULE ORAL 2 TIMES DAILY
Qty: 30 CAPSULE | Refills: 1 | Status: SHIPPED | OUTPATIENT
Start: 2023-10-27 | End: 2023-12-05

## 2023-10-27 ASSESSMENT — PAIN SCALES - GENERAL: PAINLEVEL: MODERATE PAIN (5)

## 2023-10-27 NOTE — NURSING NOTE
"Dermatology Rooming Note    Dana Hernandez's goals for this visit include:   Chief Complaint   Patient presents with    Derm Problem     HS - Dana states she has been \"alright\"     Neftali OZUNA, CMA   "

## 2023-10-27 NOTE — LETTER
10/27/2023       RE: Dana Hernandez  [address] [address]     Dear Colleague,    Thank you for referring your patient, Dana Hernandez, to the [facility] DERMATOLOG[facility] [place] at [facility]. Please see a copy of my visit note below.    [facility] Dermatology Note    Encounter Date: Oct 27, 2023    Dermatology Problem List:  1. Hidradenitis suppurativa: early Del Cid II affecting pubis/groin > axillae  - current: doxycycline 100 mg BID, adalimumab 40 mg weekly, chlorhexidine, clindamycin, spironolactone 100 mg at bedtime, OCPs  2. Recurrent right axillary subcutaneous nodule with axillary/R arm pain: possibly related to hidradenitis suppurativa  - prior imaging nonspecific  - Improves with doxycycline 100 mg BID  3. Livedo reticularis   - Pending lab evaluation  4. Dermatitis, R breast  - Resolved with doxycycline, mometasone  - Diagnostic mammography and R breast ultrasound normal     ______________________________________    Impression/Plan:    1. Hidradenitis suppurativa early Del Cid II    Under good control today. Patient reports one small papule that flares monthly with her menstrual cycle-she does feel this is under good control today. Discussed possibility of IL triamcinolone in the future if needed.   - Continue adalimumab 40 mg weekly  - Re-start doxycycline 100 mg BID (also being used for problem below)    2. R axillary pain/intermittent tender nodule    Patient having significant pain in her R axilla and R arm, and also reports intermittent nodule in this area. Breast ultrasound and mammography within normal limits. She also reports pain in her right neck and back-unclear if this is related to HS versus multifactorial other etiology, but improves with doxycycline.   - Re-start doxycycline 100 mg BID  - Patient to be starting physical therapy for R arm pain, as well as pain occurring in the R side of her neck  "and back. In the future, could consider PM&R or sports medicine referral   - Mental health referral provided today given significant stress from physical symptoms and feeling overwhelmed in other areas of her life    3. Erythematous patch on R breast  Resolved with course of doxycycline (prescribed by the ER due to concern for cellulitis) and topical mometasone. Also reassuring that mammography and ultrasound of the R breast were normal.      3. Livedo reticularis, lower extremities  Most likely to be physiologic livedo, but cannot rule out underlying disorder. Due to this, will proceed with lab evaluation.   - CBC, CMP, protein C/protein S, cryoglobulins, Beta 2 glycoprotein, anti cardiolipin antibody, Factor V leidin, lupus anticoagulant ordered    Follow-up in 3-4 months    Soni Antonio MD (PGY-3)  Dermatology Resident       Staff Involved:  Staff and Scribe    I, Jaimee Valencia, am serving as a scribe; to document services personally performed by Unruly Kuo MD -based on data collection and the provider's statements to me.     Staff Physician Comments:   I saw and evaluated the patient with the resident and I edited the assessment and plan as documented in the note. I was present for the examination.    Provider Disclosure:   The documentation recorded by the scribe accurately reflects the services I personally performed and the decisions made by me.    Unruly Kuo MD   of Dermatology  Department of Dermatology  [facility]            CC:   Chief Complaint   Patient presents with    Derm Problem     LETY Cortez states she has been \"alright\"       History of Present Illness:    Ms. Dana Hernandez is a 38 year old female who presents as a return patient. Reports that things have been going okay. Has been using a topical steroid on the breast. This area has mostly resolved. States that doxycycline helped and that she has felt the best she has in a " while for the past 2 weeks.     Reports that she takes 800 mg of ibuprofen a day for the pain in her R arm, axilla, and neck/back. 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 Feels like her body has been unusually reactive and sensitive for the past 2 years. Is wondering if some of the symptoms could be caused by the breast implant.     HS spots are doing well overall, reports a recurrent flare on one spot in her groin around the time of her menstrual cycle every month. Does not have an issue with the armpit today.  Has restarted Humira.     Overall, is feeling very stressed and overwhelmed by not knowing what is going on with her body. She also has a special needs son who requires extra care.      Labs:  N/A    Physical exam:  Vitals: There were no vitals taken for this visit.  GEN: This is a well developed, well-nourished female in no acute distress, tearful on exam today.      SKIN: Plunkett phototype 1  - Focused examination of the R axilla, R breast, and mons pubis was performed.  - Small inflammatory papule on the central mons pubis  - No residual erythema or dermatitis noted on the R breast  - No other lesions of concern on areas examined.     Past Medical History:   Past Medical History:   Diagnosis Date    Uncomplicated asthma      Past Surgical History:   Procedure Laterality Date    COSMETIC SURGERY      REMOVE AND REPLACE BREAST IMPLANT PROSTHESIS Bilateral 4/25/2017    Procedure: REMOVE AND REPLACE BREAST IMPLANT PROSTHESIS;  Remove saline breast implants, replace with silicone implants, plicate bilateral breast folds;  Surgeon: Raimundo Mayen MD;  Location:  OR       Social History:   reports that she has never smoked. She does not have any smokeless tobacco history on file. She reports that she does not drink alcohol and does not use drugs.    Family History:  No family history on file.    Medications:  Current Outpatient  Medications   Medication Sig Dispense Refill    adalimumab (HUMIRA *CF*) 40 MG/0.4ML pen kit Inject 0.4 mLs (40 mg) Subcutaneous every 7 days 1.6 mL 11    adalimumab (HUMIRA *CF*) 80 MG/0.8ML pen kit Inject 160 mg on day 1, then 80 mg 2 weeks later (day 15), followed by 40 mg every week beginning day 29 (see separate order) 2.4 mL 0    albuterol (PROAIR HFA/PROVENTIL HFA/VENTOLIN HFA) 108 (90 BASE) MCG/ACT Inhaler Inhale 2 puffs into the lungs every 6 hours      amphetamine-dextroamphetamine (ADDERALL XR) 25 MG 24 hr capsule TAKE 1 CAPSULE BY MOUTH EACH MORNING      chlorhexidine (HIBICLENS) 4 % liquid Apply topically daily 473 mL 11    clindamycin (CLEOCIN T) 1 % external solution Apply topically 2 times daily 60 mL 11    spironolactone (ALDACTONE) 100 MG tablet Take 1 tablet (100 mg) by mouth At Bedtime 90 tablet 3    triamcinolone (KENALOG) 0.1 % external cream Apply topically 2 times daily 80 g 3    HYDROcodone-acetaminophen (NORCO) 5-325 MG per tablet Take 1-2 tablets by mouth every 4 hours as needed for other (Moderate to Severe Pain) (Patient not taking: Reported on 6/26/2023) 30 tablet 0    hydrOXYzine (VISTARIL) 25 MG capsule Take 1 capsule (25 mg) by mouth every 6 hours as needed for other (take with Norco or percocet to enhance pain relief) (Patient not taking: Reported on 6/26/2023) 30 capsule 0    mometasone-formoterol (DULERA) 100-5 MCG/ACT oral inhaler Inhale 2 puffs into the lungs 2 times daily (Patient not taking: Reported on 10/27/2023)      ondansetron (ZOFRAN-ODT) 4 MG ODT tab Take 2 tablets (8 mg) by mouth every 6 hours as needed for nausea (Patient not taking: Reported on 6/26/2023) 8 tablet 0    oxyCODONE-acetaminophen (PERCOCET) 5-325 MG per tablet Take 1-2 tablets by mouth every 4 hours as needed for pain (Patient not taking: Reported on 6/26/2023) 30 tablet 0     Allergies   Allergen Reactions    Gluten Meal GI Disturbance     celiacs    Hydrocodone-Acetaminophen GI Disturbance     Levofloxacin      LEVAQUIN [id number] 03-             GI problems-vomiting    Metronidazole      FLAGYL [id number] 03-             Other-vomiting.

## 2023-10-27 NOTE — PROGRESS NOTES
[facility] Dermatology Note    Encounter Date: Oct 27, 2023    Dermatology Problem List:  1. Hidradenitis suppurativa: early Del Cid II affecting pubis/groin > axillae  - current: doxycycline 100 mg BID, adalimumab 40 mg weekly, chlorhexidine, clindamycin, spironolactone 100 mg at bedtime, OCPs  2. Recurrent right axillary subcutaneous nodule with axillary/R arm pain: possibly related to hidradenitis suppurativa  - prior imaging nonspecific  - Improves with doxycycline 100 mg BID  3. Livedo reticularis   - Pending lab evaluation  4. Dermatitis, R breast  - Resolved with doxycycline, mometasone  - Diagnostic mammography and R breast ultrasound normal     ______________________________________    Impression/Plan:    1. Hidradenitis suppurativa early Del Cid II    Under good control today. Patient reports one small papule that flares monthly with her menstrual cycle-she does feel this is under good control today. Discussed possibility of IL triamcinolone in the future if needed.   - Continue adalimumab 40 mg weekly  - Re-start doxycycline 100 mg BID (also being used for problem below)    2. R axillary pain/intermittent tender nodule    Patient having significant pain in her R axilla and R arm, and also reports intermittent nodule in this area. Breast ultrasound and mammography within normal limits. She also reports pain in her right neck and back-unclear if this is related to HS versus multifactorial other etiology, but improves with doxycycline.   - Re-start doxycycline 100 mg BID  - Patient to be starting physical therapy for R arm pain, as well as pain occurring in the R side of her neck and back. In the future, could consider PM&R or sports medicine referral   - Mental health referral provided today given significant stress from physical symptoms and feeling overwhelmed in other areas of her life    3. Erythematous patch on R breast  Resolved with course of doxycycline (prescribed by the ER due to  "concern for cellulitis) and topical mometasone. Also reassuring that mammography and ultrasound of the R breast were normal.      3. Livedo reticularis, lower extremities  Most likely to be physiologic livedo, but cannot rule out underlying disorder. Due to this, will proceed with lab evaluation.   - CBC, CMP, protein C/protein S, cryoglobulins, Beta 2 glycoprotein, anti cardiolipin antibody, Factor V leidin, lupus anticoagulant ordered    Follow-up in 3-4 months    Soni Antonio MD (PGY-3)  Dermatology Resident       Staff Involved:  Staff and Scribe    I, Jaimee Birmingham, am serving as a scribe; to document services personally performed by Unruly Kuo MD -based on data collection and the provider's statements to me.     Staff Physician Comments:   I saw and evaluated the patient with the resident and I edited the assessment and plan as documented in the note. I was present for the examination.    Provider Disclosure:   The documentation recorded by the scribe accurately reflects the services I personally performed and the decisions made by me.    Unruly Kuo MD   of Dermatology  Department of Dermatology  [facility]            CC:   Chief Complaint   Patient presents with    Derm Problem     LETY Cortez states she has been \"alright\"       History of Present Illness:    Ms. Dana Hernandez is a 38 year old female who presents as a return patient. Reports that things have been going okay. Has been using a topical steroid on the breast. This area has mostly resolved. States that doxycycline helped and that she has felt the best she has in a while for the past 2 weeks.     Reports that she takes 800 mg of ibuprofen a day for the pain in her R arm, axilla, and neck/back. 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 Feels like her body has been unusually reactive and sensitive for the past " 2 years. Is wondering if some of the symptoms could be caused by the breast implant.     HS spots are doing well overall, reports a recurrent flare on one spot in her groin around the time of her menstrual cycle every month. Does not have an issue with the armpit today.  Has restarted Humira.     Overall, is feeling very stressed and overwhelmed by not knowing what is going on with her body. She also has a special needs son who requires extra care.      Labs:  N/A    Physical exam:  Vitals: There were no vitals taken for this visit.  GEN: This is a well developed, well-nourished female in no acute distress, tearful on exam today.      SKIN: Plunkett phototype 1  - Focused examination of the R axilla, R breast, and mons pubis was performed.  - Small inflammatory papule on the central mons pubis  - No residual erythema or dermatitis noted on the R breast  - No other lesions of concern on areas examined.     Past Medical History:   Past Medical History:   Diagnosis Date    Uncomplicated asthma      Past Surgical History:   Procedure Laterality Date    COSMETIC SURGERY      REMOVE AND REPLACE BREAST IMPLANT PROSTHESIS Bilateral 4/25/2017    Procedure: REMOVE AND REPLACE BREAST IMPLANT PROSTHESIS;  Remove saline breast implants, replace with silicone implants, plicate bilateral breast folds;  Surgeon: Raimundo Mayen MD;  Location:  OR       Social History:   reports that she has never smoked. She does not have any smokeless tobacco history on file. She reports that she does not drink alcohol and does not use drugs.    Family History:  No family history on file.    Medications:  Current Outpatient Medications   Medication Sig Dispense Refill    adalimumab (HUMIRA *CF*) 40 MG/0.4ML pen kit Inject 0.4 mLs (40 mg) Subcutaneous every 7 days 1.6 mL 11    adalimumab (HUMIRA *CF*) 80 MG/0.8ML pen kit Inject 160 mg on day 1, then 80 mg 2 weeks later (day 15), followed by 40 mg every week beginning day 29 (see separate  order) 2.4 mL 0    albuterol (PROAIR HFA/PROVENTIL HFA/VENTOLIN HFA) 108 (90 BASE) MCG/ACT Inhaler Inhale 2 puffs into the lungs every 6 hours      amphetamine-dextroamphetamine (ADDERALL XR) 25 MG 24 hr capsule TAKE 1 CAPSULE BY MOUTH EACH MORNING      chlorhexidine (HIBICLENS) 4 % liquid Apply topically daily 473 mL 11    clindamycin (CLEOCIN T) 1 % external solution Apply topically 2 times daily 60 mL 11    spironolactone (ALDACTONE) 100 MG tablet Take 1 tablet (100 mg) by mouth At Bedtime 90 tablet 3    triamcinolone (KENALOG) 0.1 % external cream Apply topically 2 times daily 80 g 3    HYDROcodone-acetaminophen (NORCO) 5-325 MG per tablet Take 1-2 tablets by mouth every 4 hours as needed for other (Moderate to Severe Pain) (Patient not taking: Reported on 6/26/2023) 30 tablet 0    hydrOXYzine (VISTARIL) 25 MG capsule Take 1 capsule (25 mg) by mouth every 6 hours as needed for other (take with Norco or percocet to enhance pain relief) (Patient not taking: Reported on 6/26/2023) 30 capsule 0    mometasone-formoterol (DULERA) 100-5 MCG/ACT oral inhaler Inhale 2 puffs into the lungs 2 times daily (Patient not taking: Reported on 10/27/2023)      ondansetron (ZOFRAN-ODT) 4 MG ODT tab Take 2 tablets (8 mg) by mouth every 6 hours as needed for nausea (Patient not taking: Reported on 6/26/2023) 8 tablet 0    oxyCODONE-acetaminophen (PERCOCET) 5-325 MG per tablet Take 1-2 tablets by mouth every 4 hours as needed for pain (Patient not taking: Reported on 6/26/2023) 30 tablet 0     Allergies   Allergen Reactions    Gluten Meal GI Disturbance     celiacs    Hydrocodone-Acetaminophen GI Disturbance    Levofloxacin      LEVAQUIN [id number] 03-             GI problems-vomiting    Metronidazole      FLAGYL [id number] 03-             Other-vomiting.

## 2023-10-29 LAB
GAMMA INTERFERON BACKGROUND BLD IA-ACNC: 0 IU/ML
M TB IFN-G BLD-IMP: NEGATIVE
M TB IFN-G CD4+ BCKGRND COR BLD-ACNC: 10 IU/ML
MITOGEN IGNF BCKGRD COR BLD-ACNC: 0 IU/ML
MITOGEN IGNF BCKGRD COR BLD-ACNC: 0 IU/ML
QUANTIFERON MITOGEN: 10 IU/ML
QUANTIFERON NIL TUBE: 0 IU/ML
QUANTIFERON TB1 TUBE: 0 IU/ML
QUANTIFERON TB2 TUBE: 0

## 2023-10-30 LAB
ANA SER QL IF: NEGATIVE
B2 GLYCOPROT1 IGG SERPL IA-ACNC: 1.1 U/ML
B2 GLYCOPROT1 IGM SERPL IA-ACNC: 2.7 U/ML
CARDIOLIPIN IGG SER IA-ACNC: <2 GPL-U/ML
CARDIOLIPIN IGG SER IA-ACNC: NEGATIVE
CARDIOLIPIN IGM SER IA-ACNC: <2 MPL-U/ML
CARDIOLIPIN IGM SER IA-ACNC: NEGATIVE
DRVVT SCREEN RATIO: 0.86
INR PPP: 0.98 (ref 0.85–1.15)
LA PPP-IMP: NEGATIVE
LUPUS INTERPRETATION: NORMAL
PTT RATIO: 0.92
THROMBIN TIME: 17 SECONDS (ref 13–19)

## 2023-10-31 LAB
PROT C ACT/NOR PPP CHRO: 140 % (ref 70–170)
PROT S FREE AG ACT/NOR PPP IA: 82 % (ref 55–125)

## 2023-11-01 ENCOUNTER — MYC MEDICAL ADVICE (OUTPATIENT)
Dept: DERMATOLOGY | Facility: CLINIC | Age: 38
End: 2023-11-01
Payer: COMMERCIAL

## 2023-11-02 LAB — CRYOGLOB SER QL: NEGATIVE

## 2023-12-05 ENCOUNTER — MYC REFILL (OUTPATIENT)
Dept: DERMATOLOGY | Facility: CLINIC | Age: 38
End: 2023-12-05
Payer: COMMERCIAL

## 2023-12-05 DIAGNOSIS — L73.2 HIDRADENITIS SUPPURATIVA: ICD-10-CM

## 2023-12-05 RX ORDER — DOXYCYCLINE 100 MG/1
100 CAPSULE ORAL 2 TIMES DAILY
Qty: 60 CAPSULE | Refills: 3 | Status: SHIPPED | OUTPATIENT
Start: 2023-12-05 | End: 2024-07-09

## 2023-12-05 NOTE — TELEPHONE ENCOUNTER
Received a faxed refill request from the patient via [employer] for doxycycline hyclate (VIBRAMYCIN) 100 MG capsule . Will forward request to the refill team for review.

## 2023-12-05 NOTE — TELEPHONE ENCOUNTER
doxycycline hyclate (VIBRAMYCIN) 100 MG capsule   Last Written Prescription Date:  10/27/2023  Last Fill Quantity: 30,   # refills: 1  Last Office Visit : 10/27/2023  Future Office visit:  None    Routing refill request to provider for review/approval because:  Pt needs follow up visit for February or March  Scheduling notified      Queta Talamantes RN  Central Triage Red Flags/Med Refills

## 2023-12-18 ENCOUNTER — TELEPHONE (OUTPATIENT)
Dept: DERMATOLOGY | Facility: CLINIC | Age: 38
End: 2023-12-18
Payer: COMMERCIAL

## 2023-12-18 NOTE — TELEPHONE ENCOUNTER
Left Voicemail (1st Attempt) for the patient to call back and schedule the following:    Appointment type: Return  Provider: Dr. Kuo  Return date: 1st available  Specialty phone number: [phone]

## 2024-01-02 ENCOUNTER — TELEPHONE (OUTPATIENT)
Dept: DERMATOLOGY | Facility: CLINIC | Age: 39
End: 2024-01-02
Payer: COMMERCIAL

## 2024-01-02 NOTE — TELEPHONE ENCOUNTER
Prior Authorization Retail Medication Request    Medication/Dose: adalimumab (HUMIRA *CF*) 40 MG/0.4ML pen kit  Diagnosis and ICD code (if different than what is on RX):  see chart  New/renewal/insurance change PA/secondary ins. PA:  Previously Tried and Failed:  see chart  Rationale:  see chart    Insurance   Primary: MEDICA    Insurance ID: [id number]      Secondary (if applicable): N/A  Insurance ID:  N/A

## 2024-01-31 ENCOUNTER — TELEPHONE (OUTPATIENT)
Dept: DERMATOLOGY | Facility: CLINIC | Age: 39
End: 2024-01-31
Payer: COMMERCIAL

## 2024-01-31 NOTE — TELEPHONE ENCOUNTER
Writer spoke with rep and answered questions based on patients chart. Nothing further needed.     Jillian Paz RN

## 2024-01-31 NOTE — TELEPHONE ENCOUNTER
[facility] Call Center    Phone Message    May a detailed message be left on voicemail: yes     Reason for Call: Medication Question or concern regarding medication   Prescription Clarification  Name of Medication: Humira 40mg  Prescribing Provider: Dr. Kuo   Pharmacy:    What on the order needs clarification? Pharm called and said that this medication requires a PA. The rep has some questions to ask. The turnaround time is 48 business hours before this medication will be put on hold. Please call them back. Thanks       Action Taken: Message routed to:  Adult Clinics: Dermatology p [id number]    Travel Screening: Not Applicable

## 2024-05-08 ENCOUNTER — TELEPHONE (OUTPATIENT)
Dept: DERMATOLOGY | Facility: CLINIC | Age: 39
End: 2024-05-08
Payer: COMMERCIAL

## 2024-05-08 NOTE — LETTER
"        To Whom This May Concern,     We are writing to request coverage of long-pulsed 1064-nm Nd: YAG laser treatment for  hidradenitis suppartiva.     Hidradenitis responds well to laser hair removal as it is a follicular process. We would expect 1 treatments every 3- 6 weeks for a maximum of10 treatments.     The CPT Code Description utilized would be 85482: Unlisted procedure, skin, mucous membrane and subcutaneous tissue.         The patient has used topicals, spironolactone and humira.         Please, see the following references:  Mariana Reveles et al. \"Randomized control trial for the treatment of hidradenitis suppurativa with a neodymium?doped yttrium aluminium garnet laser.\" Dermatologic surgery 35.8 (2009): 5999-5798.     Flory Lomax, et al. \"Histopathologic study of hidradenitis suppurativa following long-pulsed 1064-nm Nd: YAG laser treatment.\" Archives of dermatology 147.1 (2011): 21-28.  Carlos Roberts, et al. \"Prospective controlled clinical and histopathologic study of hidradenitis suppurativa treated with the long-pulsed neodymium: yttrium-aluminium-garnet laser.\" Journal of the American Academy of Dermatology 62.4 (2010): 637-645.      We strive to provide our patient with outstanding care. Therefore, I request you please contact me at [phone] if you have any questions regarding coverage or our treatment rational.        Rosalia Rome MD    Department of Dermatology  [facility]: Phone: [phone], Fax:[phone]  [facility]: Phone: [phone], Fax: [phone]     "

## 2024-05-08 NOTE — TELEPHONE ENCOUNTER
Pt called and she is next to come off our ndyag wait list. She had consult with Dr Rome 7/2023. Needs letter in before we can submit PA. Will route to Latricia to writer letter so we can submit PA. Keep in pool until letter received.          Leslie Pleitez RN on 5/8/2024 at 2:17 PM

## 2024-05-12 NOTE — TELEPHONE ENCOUNTER
"To Whom This May Concern,     We are writing to request coverage of long-pulsed 1064-nm Nd: YAG laser treatment for  hidradenitis suppartiva.     Hidradenitis responds well to laser hair removal as it is a follicular process. We would expect 1 treatments every 3- 6 weeks for a maximum of10 treatments.     The CPT Code Description utilized would be 61426: Unlisted procedure, skin, mucous membrane and subcutaneous tissue.        The patient has used topicals, spironolactone and humira.        Please, see the following references:  Mariana Reveles et al. \"Randomized control trial for the treatment of hidradenitis suppurativa with a neodymium?doped yttrium aluminium garnet laser.\" Dermatologic surgery 35.8 (2009): 5828-1243.    Flory Lomax, et al. \"Histopathologic study of hidradenitis suppurativa following long-pulsed 1064-nm Nd: YAG laser treatment.\" Archives of dermatology 147.1 (2011): 21-28.  Carlos Roberts, et al. \"Prospective controlled clinical and histopathologic study of hidradenitis suppurativa treated with the long-pulsed neodymium: yttrium-aluminium-garnet laser.\" Journal of the American Academy of Dermatology 62.4 (2010): 637-645.      We strive to provide our patient with outstanding care. Therefore, I request you please contact me at [phone] if you have any questions regarding coverage or our treatment rational.       Rosalia Rome MD    Department of Dermatology  [facility]: Phone: [phone], Fax:[phone]  [facility]: Phone: [phone], Fax: [phone]    "

## 2024-05-13 NOTE — TELEPHONE ENCOUNTER
Financial Counselor Review:    Please submit letter of medical necessity from Dr. Rome dated 5/13/24 to insurance.  Procedure to be performed (include CPT code):  NdYag laser, 35662  Diagnosis:  hidradenitis suppurativa  ICD-10 code:  L73.2  # of treatments requested:  10  Sites: groin/axillae

## 2024-05-14 NOTE — TELEPHONE ENCOUNTER
[employer] sent with codes to provide to insurance and to schedule.    Leslie Pleitez RN on 5/14/2024 at 3:33 PM

## 2024-05-14 NOTE — TELEPHONE ENCOUNTER
Please have pt contact plan for specific plan benefits and coverage    PB DOS: TBD  Type of Procedure: NdYag laser  # of treatments requested:  10  Sites: groin/axillae      CPT Codes: 13878    ICD10 Codes: L73.2    Surgeon/Ordering provider: Latricia  Pre-cert/Authorization completed:  no PA required   Payer: Diann   Spoke to PA list   Ref. # / Auth #   Valid Dates:     Please advise the patient to contact their insurance for coverage and benefits. Prior authorization is not a guarantee of payment. Payment is based on the patient's benefit plan documents at the time of service

## 2024-05-22 ENCOUNTER — MYC MEDICAL ADVICE (OUTPATIENT)
Dept: DERMATOLOGY | Facility: CLINIC | Age: 39
End: 2024-05-22

## 2024-07-01 ENCOUNTER — OFFICE VISIT (OUTPATIENT)
Dept: DERMATOLOGY | Facility: CLINIC | Age: 39
End: 2024-07-01
Payer: COMMERCIAL

## 2024-07-01 DIAGNOSIS — D84.9 IMMUNOSUPPRESSION (H): ICD-10-CM

## 2024-07-01 DIAGNOSIS — L73.2 HIDRADENITIS SUPPURATIVA: Primary | ICD-10-CM

## 2024-07-01 PROCEDURE — 99214 OFFICE O/P EST MOD 30 MIN: CPT | Performed by: DERMATOLOGY

## 2024-07-01 RX ORDER — CLINDAMYCIN PHOSPHATE 11.9 MG/ML
SOLUTION TOPICAL 2 TIMES DAILY
Qty: 60 ML | Refills: 11 | Status: SHIPPED | OUTPATIENT
Start: 2024-07-01

## 2024-07-01 RX ORDER — CHLORHEXIDINE GLUCONATE 40 MG/ML
SOLUTION TOPICAL DAILY
Qty: 473 ML | Refills: 11 | Status: SHIPPED | OUTPATIENT
Start: 2024-07-01

## 2024-07-01 RX ORDER — SPIRONOLACTONE 100 MG/1
100 TABLET, FILM COATED ORAL AT BEDTIME
Qty: 90 TABLET | Refills: 3 | Status: SHIPPED | OUTPATIENT
Start: 2024-07-01

## 2024-07-01 ASSESSMENT — PAIN SCALES - GENERAL: PAINLEVEL: NO PAIN (0)

## 2024-07-01 NOTE — NURSING NOTE
Dana Hernandez's goals for this visit include:   Chief Complaint   Patient presents with    Derm Problem     Hidradenitis suppurativa follow-up.  Patient reports improvement. Patient is on Humira and spironolactone and doxy.        She requests these members of her care team be copied on today's visit information:     PCP: No Ref-Primary, Physician    Referring Provider:  Referred Self, MD  No address on file    There were no vitals taken for this visit.    Do you need any medication refills at today's visit?     Coleen Cruz on 7/1/2024 at 10:28 AM

## 2024-07-01 NOTE — PROGRESS NOTES
[facility] Dermatology Note    Encounter Date: Jul 1, 2024    Dermatology Problem List:  1. Hidradenitis suppurativa: early Del Cid II affecting pubis/groin > axillae  - current: doxycycline 100 mg BID, adalimumab 40 mg weekly, chlorhexidine, clindamycin, spironolactone 100 mg at bedtime, OCPs  2. Recurrent right axillary subcutaneous nodule with axillary/R arm pain: possibly related to hidradenitis suppurativa  - prior imaging nonspecific  - Improves with doxycycline 100 mg BID  3. Livedo reticularis   - Pending lab evaluation  4. Dermatitis, R breast  - Resolved with doxycycline, mometasone  - Diagnostic mammography and R breast ultrasound normal     ______________________________________    Impression/Plan:  1. Hidradenitis suppurativa: early Del Cid II affecting pubis/groin > axillae  - Minimal active inflammatory nodules today with some superficial folliculitis on the mons.  - Improved with doxycycline 100 mg BID, adalimumab 40 mg weekly, spironolactone 100 mg, Hibiclens PRN  - Discussed risks/benefits of increasing dose of spironolactone vs metformin  - Patient will begin metformin 500 mg daily for 2 weeks, then increase frequency to BID  - Continue adalimumab 40 mg weekly, spironolactone 100 mg daily  - Discontinue doxycycline  - Patient interested in laser hair removal, but having difficulties with insurance, will message Dr. Rome to see if she has any advice on securing coverage.  - Plan to check quantiferon at next visit.    Follow-up in 6 months.       Staff Involved:  Staff and Scribe    I, Jaimee Birmingham, am serving as a scribe; to document services personally performed by Unruly Kuo MD -based on data collection and the provider's statements to me.    Provider Disclosure:   The documentation recorded by the scribe accurately reflects the services I personally performed and the decisions made by me.    Unruly Kuo MD   of Dermatology  Department of  Dermatology  [facility]      CC:   Chief Complaint   Patient presents with    Derm Problem     Hidradenitis suppurativa follow-up.  Patient reports improvement. Patient is on Humira and spironolactone and doxy.        History of Present Illness:  Ms. Dana Hernandez is a 39 year old female who presents as a return patient here for an HS follow up. Patient reports things have been going well and has seen improvement. She is on adalimumab, spironolactone, doxycyline, and using Hibiclens as needed. She has taken breaks with doxy to see how it reacts. Patient has questions about medications that could be causing weight gain.    Patient is otherwise feeling well, with no additional skin concerns.     Labs:  10/2023 quantiferon negative    Physical exam:  Vitals: There were no vitals taken for this visit.  GEN: This is a well developed, well-nourished female in no acute distress, in a pleasant mood.    SKIN: Plunkett phototype II  - Focused examination of the axillae and groin was performed.  - Few folliculocentric erythematous papules on the groin and mons.  - No other lesions of concern on areas examined.     Past Medical History:   Past Medical History:   Diagnosis Date    Uncomplicated asthma      Past Surgical History:   Procedure Laterality Date    COSMETIC SURGERY      REMOVE AND REPLACE BREAST IMPLANT PROSTHESIS Bilateral 4/25/2017    Procedure: REMOVE AND REPLACE BREAST IMPLANT PROSTHESIS;  Remove saline breast implants, replace with silicone implants, plicate bilateral breast folds;  Surgeon: Raimundo Mayen MD;  Location:  OR       Social History:   reports that she has never smoked. She does not have any smokeless tobacco history on file. She reports that she does not drink alcohol and does not use drugs.    Family History:  No family history on file.    Medications:  Current Outpatient Medications   Medication Sig Dispense Refill    adalimumab (HUMIRA *CF*) 40  MG/0.4ML pen kit Inject 0.4 mLs (40 mg) Subcutaneous every 7 days 1.6 mL 11    adalimumab (HUMIRA *CF*) 80 MG/0.8ML pen kit Inject 160 mg on day 1, then 80 mg 2 weeks later (day 15), followed by 40 mg every week beginning day 29 (see separate order) 2.4 mL 0    albuterol (PROAIR HFA/PROVENTIL HFA/VENTOLIN HFA) 108 (90 BASE) MCG/ACT Inhaler Inhale 2 puffs into the lungs every 6 hours      amphetamine-dextroamphetamine (ADDERALL XR) 25 MG 24 hr capsule TAKE 1 CAPSULE BY MOUTH EACH MORNING      chlorhexidine (HIBICLENS) 4 % liquid Apply topically daily 473 mL 11    clindamycin (CLEOCIN T) 1 % external solution Apply topically 2 times daily 60 mL 11    doxycycline hyclate (VIBRAMYCIN) 100 MG capsule Take 1 capsule (100 mg) by mouth 2 times daily 60 capsule 3    gabapentin (NEURONTIN) 300 MG capsule Take 1 capsule (300 mg) by mouth 2 times daily 60 capsule 11    spironolactone (ALDACTONE) 100 MG tablet Take 1 tablet (100 mg) by mouth At Bedtime 90 tablet 3    HYDROcodone-acetaminophen (NORCO) 5-325 MG per tablet Take 1-2 tablets by mouth every 4 hours as needed for other (Moderate to Severe Pain) (Patient not taking: Reported on 6/26/2023) 30 tablet 0    hydrOXYzine (VISTARIL) 25 MG capsule Take 1 capsule (25 mg) by mouth every 6 hours as needed for other (take with Norco or percocet to enhance pain relief) (Patient not taking: Reported on 6/26/2023) 30 capsule 0    mometasone-formoterol (DULERA) 100-5 MCG/ACT oral inhaler Inhale 2 puffs into the lungs 2 times daily (Patient not taking: Reported on 10/27/2023)      ondansetron (ZOFRAN-ODT) 4 MG ODT tab Take 2 tablets (8 mg) by mouth every 6 hours as needed for nausea (Patient not taking: Reported on 6/26/2023) 8 tablet 0    oxyCODONE-acetaminophen (PERCOCET) 5-325 MG per tablet Take 1-2 tablets by mouth every 4 hours as needed for pain (Patient not taking: Reported on 6/26/2023) 30 tablet 0    triamcinolone (KENALOG) 0.1 % external cream Apply topically 2 times daily 80  g 3     Allergies   Allergen Reactions    Gluten Meal GI Disturbance     celiacs    Hydrocodone-Acetaminophen GI Disturbance    Levofloxacin      LEVAQUIN [id number] 03-             GI problems-vomiting    Metronidazole      FLAGYL [id number] 03-             Other-vomiting.

## 2024-07-01 NOTE — LETTER
7/1/2024      Dana Hernandez  [address] [address]      Dear Colleague,    Thank you for referring your patient, Dana Hernandez, to the [facility]. Please see a copy of my visit note below.    [facility] Dermatology Note    Encounter Date: Jul 1, 2024    Dermatology Problem List:  1. Hidradenitis suppurativa: early Del Cid II affecting pubis/groin > axillae  - current: doxycycline 100 mg BID, adalimumab 40 mg weekly, chlorhexidine, clindamycin, spironolactone 100 mg at bedtime, OCPs  2. Recurrent right axillary subcutaneous nodule with axillary/R arm pain: possibly related to hidradenitis suppurativa  - prior imaging nonspecific  - Improves with doxycycline 100 mg BID  3. Livedo reticularis   - Pending lab evaluation  4. Dermatitis, R breast  - Resolved with doxycycline, mometasone  - Diagnostic mammography and R breast ultrasound normal     ______________________________________    Impression/Plan:  1. Hidradenitis suppurativa: early Del Cid II affecting pubis/groin > axillae  - Minimal active inflammatory nodules today with some superficial folliculitis on the mons.  - Improved with doxycycline 100 mg BID, adalimumab 40 mg weekly, spironolactone 100 mg, Hibiclens PRN  - Discussed risks/benefits of increasing dose of spironolactone vs metformin  - Patient will begin metformin 500 mg daily for 2 weeks, then increase frequency to BID  - Continue adalimumab 40 mg weekly, spironolactone 100 mg daily  - Discontinue doxycycline  - Patient interested in laser hair removal, but having difficulties with insurance, will message Dr. Rome to see if she has any advice on securing coverage.  - Plan to check quantiferon at next visit.    Follow-up in 6 months.       Staff Involved:  Staff and Scribe    I, Jaimee Birmingham, am serving as a scribe; to document services personally performed by Unruly Kuo MD -based on data collection and the provider's statements to  me.    Provider Disclosure:   The documentation recorded by the scribe accurately reflects the services I personally performed and the decisions made by me.    Unruly Kuo MD   of Dermatology  Department of Dermatology  [facility]      CC:   Chief Complaint   Patient presents with     Derm Problem     Hidradenitis suppurativa follow-up.  Patient reports improvement. Patient is on Humira and spironolactone and doxy.        History of Present Illness:  Ms. Dana Hernandez is a 39 year old female who presents as a return patient here for an HS follow up. Patient reports things have been going well and has seen improvement. She is on adalimumab, spironolactone, doxycyline, and using Hibiclens as needed. She has taken breaks with doxy to see how it reacts. Patient has questions about medications that could be causing weight gain.    Patient is otherwise feeling well, with no additional skin concerns.     Labs:  10/2023 quantiferon negative    Physical exam:  Vitals: There were no vitals taken for this visit.  GEN: This is a well developed, well-nourished female in no acute distress, in a pleasant mood.    SKIN: Plunkett phototype II  - Focused examination of the axillae and groin was performed.  - Few folliculocentric erythematous papules on the groin and mons.  - No other lesions of concern on areas examined.     Past Medical History:   Past Medical History:   Diagnosis Date     Uncomplicated asthma      Past Surgical History:   Procedure Laterality Date     COSMETIC SURGERY       REMOVE AND REPLACE BREAST IMPLANT PROSTHESIS Bilateral 4/25/2017    Procedure: REMOVE AND REPLACE BREAST IMPLANT PROSTHESIS;  Remove saline breast implants, replace with silicone implants, plicate bilateral breast folds;  Surgeon: Raimundo Mayen MD;  Location:  OR       Social History:   reports that she has never smoked. She does not have any smokeless tobacco history on  file. She reports that she does not drink alcohol and does not use drugs.    Family History:  No family history on file.    Medications:  Current Outpatient Medications   Medication Sig Dispense Refill     adalimumab (HUMIRA *CF*) 40 MG/0.4ML pen kit Inject 0.4 mLs (40 mg) Subcutaneous every 7 days 1.6 mL 11     adalimumab (HUMIRA *CF*) 80 MG/0.8ML pen kit Inject 160 mg on day 1, then 80 mg 2 weeks later (day 15), followed by 40 mg every week beginning day 29 (see separate order) 2.4 mL 0     albuterol (PROAIR HFA/PROVENTIL HFA/VENTOLIN HFA) 108 (90 BASE) MCG/ACT Inhaler Inhale 2 puffs into the lungs every 6 hours       amphetamine-dextroamphetamine (ADDERALL XR) 25 MG 24 hr capsule TAKE 1 CAPSULE BY MOUTH EACH MORNING       chlorhexidine (HIBICLENS) 4 % liquid Apply topically daily 473 mL 11     clindamycin (CLEOCIN T) 1 % external solution Apply topically 2 times daily 60 mL 11     doxycycline hyclate (VIBRAMYCIN) 100 MG capsule Take 1 capsule (100 mg) by mouth 2 times daily 60 capsule 3     gabapentin (NEURONTIN) 300 MG capsule Take 1 capsule (300 mg) by mouth 2 times daily 60 capsule 11     spironolactone (ALDACTONE) 100 MG tablet Take 1 tablet (100 mg) by mouth At Bedtime 90 tablet 3     HYDROcodone-acetaminophen (NORCO) 5-325 MG per tablet Take 1-2 tablets by mouth every 4 hours as needed for other (Moderate to Severe Pain) (Patient not taking: Reported on 6/26/2023) 30 tablet 0     hydrOXYzine (VISTARIL) 25 MG capsule Take 1 capsule (25 mg) by mouth every 6 hours as needed for other (take with Norco or percocet to enhance pain relief) (Patient not taking: Reported on 6/26/2023) 30 capsule 0     mometasone-formoterol (DULERA) 100-5 MCG/ACT oral inhaler Inhale 2 puffs into the lungs 2 times daily (Patient not taking: Reported on 10/27/2023)       ondansetron (ZOFRAN-ODT) 4 MG ODT tab Take 2 tablets (8 mg) by mouth every 6 hours as needed for nausea (Patient not taking: Reported on 6/26/2023) 8 tablet 0      oxyCODONE-acetaminophen (PERCOCET) 5-325 MG per tablet Take 1-2 tablets by mouth every 4 hours as needed for pain (Patient not taking: Reported on 6/26/2023) 30 tablet 0     triamcinolone (KENALOG) 0.1 % external cream Apply topically 2 times daily 80 g 3     Allergies   Allergen Reactions     Gluten Meal GI Disturbance     celiacs     Hydrocodone-Acetaminophen GI Disturbance     Levofloxacin      LEVAQUIN [id number] 03-             GI problems-vomiting     Metronidazole      FLAGYL [id number] 03-             Other-vomiting.                 Again, thank you for allowing me to participate in the care of your patient.        Sincerely,        Unruly Kuo MD

## 2024-07-09 ENCOUNTER — VIRTUAL VISIT (OUTPATIENT)
Dept: DERMATOLOGY | Facility: CLINIC | Age: 39
End: 2024-07-09
Attending: DERMATOLOGY
Payer: COMMERCIAL

## 2024-07-09 DIAGNOSIS — L73.2 HIDRADENITIS SUPPURATIVA: Primary | ICD-10-CM

## 2024-07-09 DIAGNOSIS — F41.9 ANXIETY: ICD-10-CM

## 2024-07-09 DIAGNOSIS — J45.909 UNCOMPLICATED ASTHMA, UNSPECIFIED ASTHMA SEVERITY, UNSPECIFIED WHETHER PERSISTENT: ICD-10-CM

## 2024-07-09 RX ORDER — FLUTICASONE PROPIONATE AND SALMETEROL 250; 50 UG/1; UG/1
1 POWDER RESPIRATORY (INHALATION) EVERY 12 HOURS
COMMUNITY

## 2024-07-09 NOTE — Clinical Note
7/9/2024       RE: Dana Hernandez  [address] [address]     Dear Colleague,    Thank you for referring your patient, Dana Hernandez, to the [facility] [place] [facility]. Please see a copy of my visit note below.    Medication Therapy Management (MTM) Encounter    ASSESSMENT:                            Medication Adherence/Access: {adherencechoices:529747}    Hidradenitis suppurativa:      2. ADHD/Anxiety:      PLAN:                            ***    Follow-up: {followuptest2:557710}    SUBJECTIVE/OBJECTIVE:                          Dana Hernandez is a 39 year old female seen for an initial visit. She was referred to me from Dr Unruly Kuo MD.      Reason for visit: Humira (adalimumab) continuation.    Allergies/ADRs: Reviewed in chart  Past Medical History: Reviewed in chart  Tobacco: She reports that she has never smoked. She does not have any smokeless tobacco history on file.  Alcohol: reviewed in chart    Medication Adherence/Access: {fumedadherence:179221}    Hidradenitis suppurativa: early Stage Del Cid II    Current treatment  - Humira (adalimumab) 40mg subcutaneous every 7 days  Last dose was on Saturdays, fills through [facility], prior authorization is good through 10/12/2024.  Has been on February 2024.  Utilizes co pay card assistance     - Spironolactone 100mg at bedtime   - Chlorhexidine  - clindamycin 1% lotion  - Junel 1-20 OCP 1 tablet every day in the morning   - Metformin 500mg every day x 2 weeks, then twice daily   - Gabapentin 600mg at bedtime    Side effects: none noted.    Symptoms: feeling a lot better and less active disease (decrease in pain).  nodules mainly effect groin and mons pubis.    Specialist: Dr. Unruly uKo MD, Dermatology. Last visit on 7/1/2024. The following was recommended:   - Improved with doxycycline 100 mg BID, adalimumab 40 mg weekly, spironolactone 100  mg, Hibiclens PRN  - Discussed risks/benefits of increasing dose of spironolactone vs metformin  - Patient will begin metformin 500 mg daily for 2 weeks, then increase frequency to BID  - Continue adalimumab 40 mg weekly, spironolactone 100 mg daily  - Discontinue doxycycline    Previous treatment:   - doxycycline  - tretinoin  - triamcinolone 0.1% cream      Pre-Biologic Screening:   Hep B Surface Antibody Negative (7/31/2023)    Hep B Core Antibody  Negative (7/31/2023)    Hep B Surface Antigen Negative (7/31/2023)    Hep C Antibody  Non reactive (7/31/2023)    Quantiferon TB Gold Negative (10/27/2023)      CBC RESULTS:   Recent Labs   Lab Test 10/27/23  1055   WBC 4.6   RBC 4.66   HGB 13.2   HCT 39.9   MCV 86   MCH 28.3   MCHC 33.1   RDW 12.4        Last Comprehensive Metabolic Panel:  Lab Results   Component Value Date     10/27/2023    POTASSIUM 3.7 10/27/2023    CHLORIDE 106 10/27/2023    CO2 24 10/27/2023    ANIONGAP 9 10/27/2023     (H) 10/27/2023    BUN 13.5 10/27/2023    CR 0.75 10/27/2023    GFRESTIMATED >90 10/27/2023    KATHLEEN 9.1 10/27/2023     Lab Results   Component Value Date    AST 21 10/27/2023     Lab Results   Component Value Date    ALT 19 10/27/2023     Immunization History   Covid-19 vaccine (4690-1270 version)  Consider vaccine in 2024-25 season   Influenza (annual) Consider vaccine in 2024-25 season, avoid live FluMist   Pneumococcal  Pneumovax-23: 9/21/2011  Eligible to receive with Humira (adalimumab)  (immunomodulator)   Tetanus/Tdap  Due to receive   Shingrix Eligible to receive with Humira (adalimumab)  (immunomodulator)   All patients on biologics should avoid live vaccines (varicella/VZV, intranasal influenza, MMR, or yellow fever vaccine (if traveling))       2. ADHD/Anxiety  - Adderall XR 25mg every day   - Fluoxetine 10mg every day at bedtime    3. Asthma  - Welexa 2 puffs every day  - Albuterol 2 puffs every 4 hours as needed     Today's Vitals: There were no  vitals taken for this visit.  ----------------      I spent {mtm total time 3:990234} with this patient today. All changes were made via collaborative practice agreement with Unruly Kuo MD. A copy of the visit note was provided to the patient's provider(s).    A summary of these recommendations was sent via [employer].    Bailey Rodriguez, FranciscoD, BCPPS  Medication Therapy Management Pharmacist  [facility] Dermatology      Telemedicine Visit Details  Type of service:  Telephone visit  Start Time: ***  End Time: ***     Medication Therapy Recommendations  No medication therapy recommendations to display         Medication Therapy Management (MTM) Encounter    ASSESSMENT:                            Medication Adherence/Access: See below for considerations    Hidradenitis suppurativa: stable/improved while on Humira (adalimumab) 40mg subcutaneous weekly as seeing continued benefits in pain from nodules and decrease in frequency in activity of Hidradenitis suppurativa symptoms along with overall feeling improved quality of life with no reported side effects.  Reviewed health care maintenance with biologic medications including holding for significant infection/surgery, along with staying up to date with immunizations.  Reviewed that given patient history of asthma would recommend Prevnar-20 vaccine given on immunomodulator along with consideration to Shingrix, 2 dose series for Shingles as patient has had chicken pox as a child.  Reviewed MTM service available for any access issues with Humira (adalimumab) in the future.  Also discussed metformin in detail including dosing, mechanism of action and side effects.  Patient to start with dosing in the morning to help with GI side effects and takes most of her daily medications in the evening time.  Patient to also consider getting yearly TB screen at next appointment with dermatology    2. ADHD/Anxiety: stable, continue current regimen and follow up with PCP    3.  Asthma: stable, continue current regimen and consider receiving Prevnar-20 while on Humira (adalimumab)      PLAN:                            Patient to continue Humira (adalimumab) 40mg subcutaneous weekly  Patient to consider receiving Prevnar-20 and Shingrix, 2 dose vaccine series given on immunomodulator and history of asthma    Follow-up: via MyChart as needed, then in 6 months to assess continued safety and efficacy    SUBJECTIVE/OBJECTIVE:                          Dana Hernandez is a 39 year old female seen for an initial visit. She was referred to me from Dr Unruly Kuo MD.      Reason for visit: Humira (adalimumab) continuation.    Allergies/ADRs: Reviewed in chart  Past Medical History: Reviewed in chart  Tobacco: She reports that she has never smoked. She does not have any smokeless tobacco history on file.  Alcohol: reviewed in chart    Medication Adherence/Access: no issues reported    Hidradenitis suppurativa: early Stage Del Cid II    Current treatment  - Humira (adalimumab) 40mg subcutaneous every 7 days  Last dose was on Saturdays, fills through [facility], prior authorization is good through 10/12/2024.  Has been on since 7/2023.  Utilizes co pay card assistance     - Spironolactone 100mg at bedtime   - Chlorhexidine  - clindamycin 1% lotion  - Junel 1-20 OCP 1 tablet every day in the morning   - Metformin 500mg every day x 2 weeks, then twice daily - has not started yet, reviewed side effects and reason for titration  - Gabapentin 600mg at bedtime    Side effects: none noted.    Symptoms: feeling a lot better and less active disease (decrease in pain which is symptom patient reported most affected quality of life).  nodules mainly effect groin and mons pubis.    Specialist: Dr. Unruly Kuo MD, Dermatology. Last visit on 7/1/2024. The following was recommended:   - Improved with doxycycline 100 mg BID, adalimumab 40 mg weekly, spironolactone 100 mg, Hibiclens PRN  - Discussed  risks/benefits of increasing dose of spironolactone vs metformin  - Patient will begin metformin 500 mg daily for 2 weeks, then increase frequency to BID  - Continue adalimumab 40 mg weekly, spironolactone 100 mg daily  - Discontinue doxycycline    Previous treatment:   - doxycycline  - tretinoin  - triamcinolone 0.1% cream      Pre-Biologic Screening:   Hep B Surface Antibody Negative (7/31/2023)    Hep B Core Antibody  Negative (7/31/2023)    Hep B Surface Antigen Negative (7/31/2023)    Hep C Antibody  Non reactive (7/31/2023)    Quantiferon TB Gold Negative (10/27/2023)      CBC RESULTS:   Recent Labs   Lab Test 10/27/23  1055   WBC 4.6   RBC 4.66   HGB 13.2   HCT 39.9   MCV 86   MCH 28.3   MCHC 33.1   RDW 12.4        Last Comprehensive Metabolic Panel:  Lab Results   Component Value Date     10/27/2023    POTASSIUM 3.7 10/27/2023    CHLORIDE 106 10/27/2023    CO2 24 10/27/2023    ANIONGAP 9 10/27/2023     (H) 10/27/2023    BUN 13.5 10/27/2023    CR 0.75 10/27/2023    GFRESTIMATED >90 10/27/2023    KATHLEEN 9.1 10/27/2023     Lab Results   Component Value Date    AST 21 10/27/2023     Lab Results   Component Value Date    ALT 19 10/27/2023     Immunization History   Covid-19 vaccine (1369-0253 version)  Consider vaccine in 2024-25 season   Influenza (annual) Consider vaccine in 2024-25 season, avoid live FluMist   Pneumococcal  Pneumovax-23: 9/21/2011  Eligible to receive with Humira (adalimumab)  (immunomodulator)   Tetanus/Tdap  Due to receive   Shingrix Eligible to receive with Humira (adalimumab)  (immunomodulator)   All patients on biologics should avoid live vaccines (varicella/VZV, intranasal influenza, MMR, or yellow fever vaccine (if traveling))       2. ADHD/Anxiety  - Adderall XR 25mg every day   - Fluoxetine 10mg every day at bedtime  Reports no side effects and working well    3. Asthma  - Wixela 2 puffs every day  - Albuterol 2 puffs every 4 hours as needed   Reports no side effects  and working well    Today's Vitals: There were no vitals taken for this visit.  ----------------  I spent 13 minutes with this patient today. All changes were made via collaborative practice agreement with Unruly Kuo MD. A copy of the visit note was provided to the patient's provider(s).    A summary of these recommendations was sent via [employer].    Bailey Rodriguez PharmD, [facility]  Medication Therapy Management Pharmacist  [facility] Dermatology      Telemedicine Visit Details  Type of service:  Telephone visit  Start Time:  9:00 AM  End Time:  9:13 AM     Medication Therapy Recommendations  No medication therapy recommendations to display           Again, thank you for allowing me to participate in the care of your patient.      Sincerely,    Bailey Rodriguez [facility]

## 2024-07-09 NOTE — PROGRESS NOTES
Medication Therapy Management (MTM) Encounter    ASSESSMENT:                            Medication Adherence/Access: See below for considerations    Hidradenitis suppurativa: stable/improved while on Humira (adalimumab) 40mg subcutaneous weekly as seeing continued benefits in pain from nodules and decrease in frequency in activity of Hidradenitis suppurativa symptoms along with overall feeling improved quality of life with no reported side effects.  Reviewed health care maintenance with biologic medications including holding for significant infection/surgery, along with staying up to date with immunizations.  Reviewed that given patient history of asthma would recommend Prevnar-20 vaccine given on immunomodulator along with consideration to Shingrix, 2 dose series for Shingles as patient has had chicken pox as a child.  Reviewed MTM service available for any access issues with Humira (adalimumab) in the future.  Also discussed metformin in detail including dosing, mechanism of action and side effects.  Patient to start with dosing in the morning to help with GI side effects and takes most of her daily medications in the evening time.  Patient to also consider getting yearly TB screen at next appointment with dermatology    2. ADHD/Anxiety: stable, continue current regimen and follow up with PCP    3. Asthma: stable, continue current regimen and consider receiving Prevnar-20 while on Humira (adalimumab)      PLAN:                            Patient to continue Humira (adalimumab) 40mg subcutaneous weekly  Patient to consider receiving Prevnar-20 and Shingrix, 2 dose vaccine series given on immunomodulator and history of asthma  Patient will be due for QFN gold screening after 10/2024    Follow-up: via MyChart as needed, then in 6 months to assess continued safety and efficacy    SUBJECTIVE/OBJECTIVE:                          Dana Hernandez is a 39 year old female seen for an initial visit. She was referred to me from  Dr Unruly Kuo MD.      Reason for visit: Humira (adalimumab) continuation.    Allergies/ADRs: Reviewed in chart  Past Medical History: Reviewed in chart  Tobacco: She reports that she has never smoked. She does not have any smokeless tobacco history on file.  Alcohol: reviewed in chart    Medication Adherence/Access: no issues reported    Hidradenitis suppurativa: early Stage Del Cid II    Current treatment  - Humira (adalimumab) 40mg subcutaneous every 7 days  Last dose was on Saturdays, fills through [facility], prior authorization is good through 10/12/2024.  Has been on since 7/2023.  Utilizes co pay card assistance     - Spironolactone 100mg at bedtime   - Chlorhexidine  - clindamycin 1% lotion  - Junel 1-20 OCP 1 tablet every day in the morning   - Metformin 500mg every day x 2 weeks, then twice daily - has not started yet, reviewed side effects and reason for titration  - Gabapentin 600mg at bedtime    Side effects: none noted.    Symptoms: feeling a lot better and less active disease (decrease in pain which is symptom patient reported most affected quality of life).  nodules mainly effect groin and mons pubis.    Specialist: Dr. Unruly Kuo MD, Dermatology. Last visit on 7/1/2024. The following was recommended:   - Improved with doxycycline 100 mg BID, adalimumab 40 mg weekly, spironolactone 100 mg, Hibiclens PRN  - Discussed risks/benefits of increasing dose of spironolactone vs metformin  - Patient will begin metformin 500 mg daily for 2 weeks, then increase frequency to BID  - Continue adalimumab 40 mg weekly, spironolactone 100 mg daily  - Discontinue doxycycline    Previous treatment:   - doxycycline  - tretinoin  - triamcinolone 0.1% cream      Pre-Biologic Screening:   Hep B Surface Antibody Negative (7/31/2023)    Hep B Core Antibody  Negative (7/31/2023)    Hep B Surface Antigen Negative (7/31/2023)    Hep C Antibody  Non reactive (7/31/2023)    Quantiferon TB Gold Negative  (10/27/2023)      CBC RESULTS:   Recent Labs   Lab Test 10/27/23  1055   WBC 4.6   RBC 4.66   HGB 13.2   HCT 39.9   MCV 86   MCH 28.3   MCHC 33.1   RDW 12.4        Last Comprehensive Metabolic Panel:  Lab Results   Component Value Date     10/27/2023    POTASSIUM 3.7 10/27/2023    CHLORIDE 106 10/27/2023    CO2 24 10/27/2023    ANIONGAP 9 10/27/2023     (H) 10/27/2023    BUN 13.5 10/27/2023    CR 0.75 10/27/2023    GFRESTIMATED >90 10/27/2023    KATHLEEN 9.1 10/27/2023     Lab Results   Component Value Date    AST 21 10/27/2023     Lab Results   Component Value Date    ALT 19 10/27/2023     Immunization History   Covid-19 vaccine (0760-8328 version)  Consider vaccine in 2024-25 season   Influenza (annual) Consider vaccine in 2024-25 season, avoid live FluMist   Pneumococcal  Pneumovax-23: 9/21/2011  Eligible to receive with Humira (adalimumab)  (immunomodulator)   Tetanus/Tdap  Due to receive   Shingrix Eligible to receive with Humira (adalimumab)  (immunomodulator)   All patients on biologics should avoid live vaccines (varicella/VZV, intranasal influenza, MMR, or yellow fever vaccine (if traveling))       2. ADHD/Anxiety  - Adderall XR 25mg every day   - Fluoxetine 10mg every day at bedtime  Reports no side effects and working well    3. Asthma  - Wixela 2 puffs every day  - Albuterol 2 puffs every 4 hours as needed   Reports no side effects and working well    Today's Vitals: There were no vitals taken for this visit.  ----------------  I spent 13 minutes with this patient today. All changes were made via collaborative practice agreement with Unruly Kuo MD. A copy of the visit note was provided to the patient's provider(s).    A summary of these recommendations was sent via datapine.    Bailey Rodriguez, FranciscoD, BCPPS  Medication Therapy Management Pharmacist  [facility]      Telemedicine Visit Details  Type of service:  Telephone visit  Start Time:  9:00 AM  End Time:  9:13 AM      Medication Therapy Recommendations  No medication therapy recommendations to display

## 2024-07-18 ENCOUNTER — TELEPHONE (OUTPATIENT)
Dept: DERMATOLOGY | Facility: CLINIC | Age: 39
End: 2024-07-18
Payer: COMMERCIAL

## 2024-07-18 NOTE — TELEPHONE ENCOUNTER
"PA request for Adalimumab-Ryvk(Cf) Autoinject Autoinjkit from [employer] states \"[employer] manages the Rx drug benefit for your PT at the request of [employer]. You have prescribed a medication for your PT that requires a PA before benefit coverage can be provided.\"  Case ID: [id number]  Tel: [phone]  Fax: [phone]    Wallace Beckwith on 7/18/2024 at 10:24 AM    "

## 2024-07-22 ENCOUNTER — MYC MEDICAL ADVICE (OUTPATIENT)
Dept: DERMATOLOGY | Facility: CLINIC | Age: 39
End: 2024-07-22
Payer: COMMERCIAL

## 2024-07-22 NOTE — TELEPHONE ENCOUNTER
[facility] Call Center    Phone Message    May a detailed message be left on voicemail: yes     Reason for Call: Other: Pt is calling regarding below. Pt is requesting a refill for the humira but her pharm needs more information.  Pt said that her insurance is requesting a generic version of humira. Pt also stated that the quantity on the script is too much so the pharm is unable to fill it. Pt said that Dr. Kuo will need [facility]. The quantity that was placed was too much. Pt really needs it before Thursday. Please call her back so she knows that it's been done. Pt also request to speak to Bailey Rodriguez also. Thanks      Phone# for Authorization: [phone] quantity authorization    Action Taken: Message routed to:  Adult Clinics: Dermatology p [id number]    Travel Screening: Not Applicable     Date of Service:

## 2024-07-22 NOTE — TELEPHONE ENCOUNTER
Pt is calling to see if authorization has been completed because she needs the medication before Friday, she would also like the clinic to call her back once it is done so she can follow-up with her pharmacy thanks!

## 2024-09-07 ENCOUNTER — HEALTH MAINTENANCE LETTER (OUTPATIENT)
Age: 39
End: 2024-09-07

## 2024-10-31 ENCOUNTER — MYC MEDICAL ADVICE (OUTPATIENT)
Dept: DERMATOLOGY | Facility: CLINIC | Age: 39
End: 2024-10-31
Payer: COMMERCIAL

## 2024-10-31 DIAGNOSIS — L73.2 HIDRADENITIS SUPPURATIVA: Primary | ICD-10-CM

## 2024-10-31 RX ORDER — DOXYCYCLINE 100 MG/1
100 CAPSULE ORAL 2 TIMES DAILY
Qty: 180 CAPSULE | Refills: 3 | Status: SHIPPED | OUTPATIENT
Start: 2024-10-31

## 2024-11-14 ENCOUNTER — TELEPHONE (OUTPATIENT)
Dept: DERMATOLOGY | Facility: CLINIC | Age: 39
End: 2024-11-14
Payer: COMMERCIAL

## 2024-11-14 NOTE — TELEPHONE ENCOUNTER
PA Initiation    Medication: HUMIRA *CF* 40 MG/0.4ML SC PSKT  Insurance Company: [employer] Non-Specialty PA's - Phone [phone] Fax [phone]  Pharm[facility] Filling the Rx: [facility] - [address]  Filling Pharmacy Phone: [phone]  Filling Pharmacy Fax: [phone]  Start Date: 11/14/2024     Called [employer]  at [phone] and started PA via phone.    Thank you,     Desmond Newton [facility]  Pharmacy Clinic [facility]  [email or web address]   Phone: [phone]  Fax: [phone]

## 2024-11-14 NOTE — TELEPHONE ENCOUNTER
Prior Authorization Approval    Medication: HUMIRA *CF* 40 MG/0.4ML SC PSKT  Authorization Effective Date: 10/15/2024  Authorization Expiration Date: 11/14/2025  Approved Dose/Quantity: 4 pens per 28 days  Reference #: CaseID#: [id number]   Insurance Company: [employer] Non-Specialty PA's - Phone [phone] Fax [phone]  Expected CoPay: $    CoPay Card Available:      Financial Assistance Needed:   Which Pharmacy is filling the prescription: [facility] - [address] [place] [address]  Pharmacy Notified: Yes  Patient Notified:     Received verbal approval. Called Karen at [phone] and provided verbal PA approval with note that patient is out of her Humira and needs this processed urgently. They stated that they updated their system and will work on this urgent request.     Thank you,     Desmond Newton, [facility]  Pharm[facility] Clinic [email or web address] [email or web address]   Phone: [phone]  Fax: [phone]

## 2025-01-13 NOTE — PROGRESS NOTES
Medication Therapy Management (MTM) Encounter    ASSESSMENT:                            Medication Adherence/Access: No issues identified.    Hidradenitis suppurativa, Del Cid Stage II: Stable, patient would benefit from continuing weekly Humira (adalimumab). She has a follow up visit with Dr. Kuo at the end of January and will likely be due for updated labs, specifically Quantiferon gold TB, CBC, and CMP for continued safety monitoring. Discussed drawing lab for A1c since she had concerns with insulin resistance and low blood sugars. Encouraged patient to eat a meal or a snack when taking doxycycline to help minimize nausea. This medication works best when taken twice daily. Encouraged the following recommended vaccines: Covid booster, annual influenza, tetanus booster, pneumonia, and Shingrix.     ADHD/Anxiety: Continue current regimen. Encouraged patient to reach out to primary care provider to initiate fluoxetine since she's been off the medication the last month and is experiencing anxiety symptoms.     Asthma: Continue current regimen.    PLAN:                            Continue Humira (adalimumab) 40 mg every 7 days  Discuss A1c lab with Dr. Kuo at your follow up visit, as well as updated CBC, CMP, and TB test.   Reach out to your primary care provider to discuss starting fluoxetine, if appropriate.  Consider receiving the following vaccination(s): COVID-19 booster, annual flu shot, tetanus booster (Tdap or similar), pneumonia (PCV-20 preferred), and shingles (Shingrix; 2 dose-series).    Follow-up: as needed via MyChart and in 9-12 months via telephone to continue assessing safety and efficacy.    SUBJECTIVE/OBJECTIVE:                          Dana Hernandez is a 39 year old female called for a follow-up visit. She has a follow up visit with Dr. Kuo on 1/27/2025.     Reason for visit: Humira (adalimumab) continuation.    Allergies/ADRs: Reviewed in chart.  Past Medical History: Reviewed in  chart.  Tobacco: She reports that she has never smoked. She does not have any smokeless tobacco history on file.  Alcohol: Reviewed in chart.    Medication Adherence/Access: no issues reported.    Hidradenitis suppurativa, Del Cid Stage II:   Current treatment:  - Humira (adalimumab) 40 mg every 7 days  Started 7/2023  [facility]  PA good through 11/14/2025    - Spironolactone 100 mg at bedtime   - Chlorhexidine wash daily as needed   - clindamycin 1% lotion daily as needed   - Junel 1-20 OCP 1 tablet every day in the morning   - Metformin 500 mg twice daily - notices sometimes in the afternoon low blood sugar symptoms (shaking, nausea); she hasn't noticed a difference in her symptoms since starting  - Gabapentin 600 mg at bedtime  - doxycycline 100 mg twice daily - sometimes takes once daily, makes her nauseas     Symptoms: notices flares about 1-2 weeks before period, but not as bad as it was before Humira. Reports flares are tolerable.    Side effects: denies    Specialist: Dr. Unruly Kuo MD, Dermatology.     Previous treatment:   - doxycycline  - tretinoin  - triamcinolone 0.1% cream    Pre-Biologic Screening:   Hep B Surface Antibody Negative (7/31/2023)    Hep B Core Antibody  Negative (7/31/2023)    Hep B Surface Antigen Negative (7/31/2023)    Hep C Antibody  Non reactive (7/31/2023)    Quantiferon TB Gold Negative (10/27/2023)       Last CMP 10/27/2023  Last CBC 10/27/2023    Immunization History   Covid-19 vaccine  Consider vaccine in 2024-25 season   Influenza (annual) Consider vaccine in 2024-25 season   Tetanus/Tdap Due to receive   Pneumococcal   Up-to-date   Shingrix Eligible to receive with immunomodulator start   All patients on biologics should avoid live vaccines (varicella/VZV, intranasal influenza, MMR, or yellow fever vaccine (if traveling))      ADHD/Anxiety  - Adderall XR 25 mg every day   - Adderall IR 10-20 mg once daily as needed   Denies side effects or concerns. Notes  she'd like to restart fluoxetine.     Asthma  - Wixela 1 puff twice daily   - Albuterol 2 puffs every 4 hours as needed   Denies side effects or concerns. Reports asthma is well-controlled    Today's Vitals: There were no vitals taken for this visit.  ----------------      I spent 15 minutes with this patient today. All changes were made via collaborative practice agreement with Dr. Unruly Kuo MD. A copy of the visit note was provided to the patient's provider(s).    A summary of these recommendations was sent via Clinkle.    Sheila Kenyon, FranciscoD, MPH  Medication Therapy Management Pharmacist  [facility]    Telemedicine Visit Details  The patient's medications can be safely assessed via a telemedicine encounter.  Type of service:  Telephone visit  Originating Location (pt. Location): Home    Distant Location (provider location):  Off-site  Start Time:  9:30 AM  End Time: 9:45 AM     Medication Therapy Recommendations  No medication therapy recommendations to display

## 2025-01-14 ENCOUNTER — VIRTUAL VISIT (OUTPATIENT)
Dept: PHARMACY | Facility: CLINIC | Age: 40
End: 2025-01-14
Attending: DERMATOLOGY
Payer: COMMERCIAL

## 2025-01-14 DIAGNOSIS — F90.9 ADHD (ATTENTION DEFICIT HYPERACTIVITY DISORDER): ICD-10-CM

## 2025-01-14 DIAGNOSIS — L73.2 HIDRADENITIS SUPPURATIVA: Primary | ICD-10-CM

## 2025-01-14 DIAGNOSIS — J45.909 MILD ASTHMA, UNSPECIFIED WHETHER COMPLICATED, UNSPECIFIED WHETHER PERSISTENT: ICD-10-CM

## 2025-01-14 RX ORDER — DEXTROAMPHETAMINE SACCHARATE, AMPHETAMINE ASPARTATE, DEXTROAMPHETAMINE SULFATE AND AMPHETAMINE SULFATE 5; 5; 5; 5 MG/1; MG/1; MG/1; MG/1
10-20 TABLET ORAL DAILY PRN
COMMUNITY
Start: 2024-12-28

## 2025-01-14 RX ORDER — NORETHINDRONE ACETATE AND ETHINYL ESTRADIOL 1; 20 MG/1; UG/1
1 TABLET ORAL DAILY
COMMUNITY
Start: 2024-10-28

## 2025-01-27 ENCOUNTER — OFFICE VISIT (OUTPATIENT)
Dept: DERMATOLOGY | Facility: CLINIC | Age: 40
End: 2025-01-27
Attending: DERMATOLOGY
Payer: COMMERCIAL

## 2025-01-27 ENCOUNTER — ANCILLARY PROCEDURE (OUTPATIENT)
Dept: GENERAL RADIOLOGY | Facility: CLINIC | Age: 40
End: 2025-01-27
Attending: DERMATOLOGY
Payer: COMMERCIAL

## 2025-01-27 DIAGNOSIS — L91.8 ACROCHORDON: ICD-10-CM

## 2025-01-27 DIAGNOSIS — G54.0 THORACIC OUTLET SYNDROME: ICD-10-CM

## 2025-01-27 DIAGNOSIS — D84.9 IMMUNOSUPPRESSION: ICD-10-CM

## 2025-01-27 DIAGNOSIS — G62.9 NEUROPATHY: ICD-10-CM

## 2025-01-27 DIAGNOSIS — L73.2 HIDRADENITIS SUPPURATIVA: Primary | ICD-10-CM

## 2025-01-27 PROCEDURE — 71046 X-RAY EXAM CHEST 2 VIEWS: CPT | Mod: GC | Performed by: RADIOLOGY

## 2025-01-27 RX ORDER — CYANOCOBALAMIN 1000 UG/ML
1000 INJECTION, SOLUTION INTRAMUSCULAR; SUBCUTANEOUS
COMMUNITY
Start: 2024-11-06 | End: 2025-08-13

## 2025-01-27 RX ORDER — GABAPENTIN 300 MG/1
600 CAPSULE ORAL AT BEDTIME
Qty: 60 CAPSULE | Refills: 11 | Status: SHIPPED | OUTPATIENT
Start: 2025-01-27

## 2025-01-27 NOTE — LETTER
1/27/2025      Dana Hernandez  [address] [address]      Dear Colleague,    Thank you for referring your patient, Dana Hernandez, to the [facility]. Please see a copy of my visit note below.    [facility] Dermatology Note    Encounter Date: Jan 27, 2025    Dermatology Problem List:  1. Hidradenitis suppurativa: early Del Cid II affecting pubis/groin > axillae  - current: doxycycline 100 mg BID, adalimumab 40 mg weekly, chlorhexidine, clindamycin, spironolactone 100 mg at bedtime, OCPs, metformin  - Last quant. Gold: 10/2023  2. Recurrent right axillary subcutaneous nodule with axillary/R arm pain: possibly related to hidradenitis suppurativa  - prior imaging nonspecific  - Improves with doxycycline 100 mg BID  3. Livedo reticularis   - Pending lab evaluation  4. Dermatitis, R breast  - Resolved with doxycycline, mometasone  - Diagnostic mammography and R breast ultrasound normal  5. Swelling in right axilla with neuropathic symptoms in the right arm. DDX: lymphedema vs thoracic outlet syndrome   - Keep scheduled appointment with lymphedema specialist, gynecology   - Ordered chest x-ray today  ______________________________________    Impression/Plan:  1. Hidradenitis suppurativa: early Del Cid II affecting pubis/groin > axillae  - Minimal active inflammatory nodules today with some superficial folliculitis on the mons.  - continue doxycycline 100 mg bid  - continue metformin 500 mg BID  - Continue adalimumab 40 mg weekly,   - continue spironolactone 100 mg daily  - continue clindamycin lotion daily  - continue Hibiclens daily    2. Acrochordon, non irritated, defer treatment today    3. Swelling in right axilla with neuropathic symptoms in the right arm. DDX: lymphedema vs thoracic outlet syndrome   - Keep scheduled appointment with lymphedema specialist, gynecology   - Ordered chest x-ray today    Follow-up in 6 months.       Staff Involved:  Staff and  Scribe    Scribe Disclosure:   I, Angie Marsh, am serving as a scribe; to document services personally performed by Unruly Kuo MD -based on data collection and the provider's statements to me.     Provider Disclosure:   The documentation recorded by the scribe accurately reflects the services I personally performed and the decisions made by me.    Unruly Kuo MD   of Dermatology  Department of Dermatology  [facility]        CC:   Chief Complaint   Patient presents with     RECHECK     HS follow up. Patient reports things are going good and things have stayed the same since last visit       History of Present Illness:  Ms. Dana Hernandez is a 39 year old female who presents as a return patient.    Here for HS follow up. She is taking humira, spironolactone, metformin 500 mg at bedtime and doxycycline. Patient is using clindamycin lotion and hibiclens wash. She reports, that her HS has been stable since last visit. She feels that her hormones plays a role with her hs flaring. She will flare on one side of her body with swelling on her breast and rib cage. She is scheduled with lymphedema specialist. She has noticed, that a week before ending her birth control, she will flare with inflammation.    Labs:  2021-CT scan reviewed  10/2023: cesar Macias reviewed    Physical exam:  Vitals: There were no vitals taken for this visit.  GEN: This is a well developed, well-nourished female in no acute distress, in a pleasant mood.    SKIN: Plunkett phototype II  - Focused examination of the face was performed.  - There is(are) skin colored pedunculated papules with erythema on the left inferior eyelid.   - No other lesions of concern on areas examined.     Past Medical History:   Past Medical History:   Diagnosis Date     Uncomplicated asthma      Past Surgical History:   Procedure Laterality Date     COSMETIC SURGERY       REMOVE AND REPLACE BREAST IMPLANT  PROSTHESIS Bilateral 4/25/2017    Procedure: REMOVE AND REPLACE BREAST IMPLANT PROSTHESIS;  Remove saline breast implants, replace with silicone implants, plicate bilateral breast folds;  Surgeon: Raimundo Mayen MD;  Location:  OR       Social History:   reports that she has never smoked. She does not have any smokeless tobacco history on file. She reports that she does not drink alcohol and does not use drugs.    Family History:  No family history on file.    Medications:  Current Outpatient Medications   Medication Sig Dispense Refill     adalimumab (HUMIRA *CF*) 40 MG/0.4ML pen kit Inject 0.4 mLs (40 mg) Subcutaneous every 7 days 1.6 mL 12     albuterol (PROAIR HFA/PROVENTIL HFA/VENTOLIN HFA) 108 (90 BASE) MCG/ACT Inhaler Inhale 2 puffs into the lungs every 6 hours       amphetamine-dextroamphetamine (ADDERALL XR) 25 MG 24 hr capsule TAKE 1 CAPSULE BY MOUTH EACH MORNING       amphetamine-dextroamphetamine (ADDERALL) 20 MG tablet Take 10-20 mg by mouth daily as needed (for focus if needed in afternoon).       chlorhexidine (HIBICLENS) 4 % solution Apply topically daily 473 mL 11     clindamycin (CLEOCIN T) 1 % external solution Apply topically 2 times daily 60 mL 11     doxycycline monohydrate (MONODOX) 100 MG capsule Take 1 capsule (100 mg) by mouth 2 times daily. 180 capsule 3     fluticasone-salmeterol (ADVAIR) 250-50 MCG/ACT inhaler Inhale 1 puff into the lungs every 12 hours       gabapentin (NEURONTIN) 300 MG capsule Take 1 capsule (300 mg) by mouth 2 times daily 60 capsule 11     JUNEL 1/20 1-20 MG-MCG tablet Take 1 tablet by mouth daily.       metFORMIN (GLUCOPHAGE) 500 MG tablet Take 1 tablet (500 mg) by mouth 2 times daily (with meals) 180 tablet 3     spironolactone (ALDACTONE) 100 MG tablet Take 1 tablet (100 mg) by mouth at bedtime 90 tablet 3     cyanocobalamin (CYANOCOBALAMIN) 1000 mcg/mL injection Inject 1,000 mcg into the muscle once every eight weeks. (Patient not taking: Reported on  1/27/2025)       Allergies   Allergen Reactions     Gluten Meal GI Disturbance     celiacs     Hydrocodone-Acetaminophen GI Disturbance     Levofloxacin      LEVAQUIN [id number] 03-             GI problems-vomiting     Metronidazole      FLAGYL [id number] 03-             Other-vomiting.         Again, thank you for allowing me to participate in the care of your patient.        Sincerely,    Unruly Kuo MD    Electronically signed

## 2025-01-27 NOTE — NURSING NOTE
Dana Hernandez's goals for this visit include:   Chief Complaint   Patient presents with    RECHECK     HS follow up. Patient reports things are going good and things have stayed the same since last visit       She requests these members of her care team be copied on today's visit information:     PCP: No Ref-Primary, Physician    Referring Provider:  Unruly Kuo MD  [address] [place] [id number]    There were no vitals taken for this visit.    Do you need any medication refills at today's visit?     Sophia Reyna MA on 1/27/2025 at 9:20 AM

## 2025-01-27 NOTE — PROGRESS NOTES
[facility] Dermatology Note    Encounter Date: Jan 27, 2025    Dermatology Problem List:  1. Hidradenitis suppurativa: early Del Cid II affecting pubis/groin > axillae  - current: doxycycline 100 mg BID, adalimumab 40 mg weekly, chlorhexidine, clindamycin, spironolactone 100 mg at bedtime, OCPs, metformin  - Last quant. Gold: 10/2023  2. Recurrent right axillary subcutaneous nodule with axillary/R arm pain: possibly related to hidradenitis suppurativa  - prior imaging nonspecific  - Improves with doxycycline 100 mg BID  3. Livedo reticularis   - Pending lab evaluation  4. Dermatitis, R breast  - Resolved with doxycycline, mometasone  - Diagnostic mammography and R breast ultrasound normal  5. Swelling in right axilla with neuropathic symptoms in the right arm. DDX: lymphedema vs thoracic outlet syndrome   - Keep scheduled appointment with lymphedema specialist, gynecology   - Ordered chest x-ray today  ______________________________________    Impression/Plan:  1. Hidradenitis suppurativa: early Del Cid II affecting pubis/groin > axillae  - Minimal active inflammatory nodules today with some superficial folliculitis on the mons.  - continue doxycycline 100 mg bid  - continue metformin 500 mg BID  - Continue adalimumab 40 mg weekly,   - continue spironolactone 100 mg daily  - continue clindamycin lotion daily  - continue Hibiclens daily    2. Acrochordon, non irritated, defer treatment today    3. Swelling in right axilla with neuropathic symptoms in the right arm. DDX: lymphedema vs thoracic outlet syndrome   - Keep scheduled appointment with lymphedema specialist, gynecology   - Ordered chest x-ray today    Follow-up in 6 months.       Staff Involved:  Staff and Scribe    Scribe Disclosure:   I, Angie Marsh, am serving as a scribe; to document services personally performed by Unruly Kuo MD -based on data collection and the provider's statements to me.     Provider Disclosure:   The  documentation recorded by the scribe accurately reflects the services I personally performed and the decisions made by me.    Unruly Kuo MD   of Dermatology  Department of Dermatology  [facility]        CC:   Chief Complaint   Patient presents with    RECHECK     HS follow up. Patient reports things are going good and things have stayed the same since last visit       History of Present Illness:  Ms. Dana Hernandez is a 39 year old female who presents as a return patient.    Here for HS follow up. She is taking humira, spironolactone, metformin 500 mg at bedtime and doxycycline. Patient is using clindamycin lotion and hibiclens wash. She reports, that her HS has been stable since last visit. She feels that her hormones plays a role with her hs flaring. She will flare on one side of her body with swelling on her breast and rib cage. She is scheduled with lymphedema specialist. She has noticed, that a week before ending her birth control, she will flare with inflammation.    Labs:  2021-CT scan reviewed  10/2023: cesar Macias reviewed    Physical exam:  Vitals: There were no vitals taken for this visit.  GEN: This is a well developed, well-nourished female in no acute distress, in a pleasant mood.    SKIN: Plunkett phototype II  - Focused examination of the face was performed.  - There is(are) skin colored pedunculated papules with erythema on the left inferior eyelid.   - No other lesions of concern on areas examined.     Past Medical History:   Past Medical History:   Diagnosis Date    Uncomplicated asthma      Past Surgical History:   Procedure Laterality Date    COSMETIC SURGERY      REMOVE AND REPLACE BREAST IMPLANT PROSTHESIS Bilateral 4/25/2017    Procedure: REMOVE AND REPLACE BREAST IMPLANT PROSTHESIS;  Remove saline breast implants, replace with silicone implants, plicate bilateral breast folds;  Surgeon: Raimundo Mayen MD;  Location:  OR        Social History:   reports that she has never smoked. She does not have any smokeless tobacco history on file. She reports that she does not drink alcohol and does not use drugs.    Family History:  No family history on file.    Medications:  Current Outpatient Medications   Medication Sig Dispense Refill    adalimumab (HUMIRA *CF*) 40 MG/0.4ML pen kit Inject 0.4 mLs (40 mg) Subcutaneous every 7 days 1.6 mL 12    albuterol (PROAIR HFA/PROVENTIL HFA/VENTOLIN HFA) 108 (90 BASE) MCG/ACT Inhaler Inhale 2 puffs into the lungs every 6 hours      amphetamine-dextroamphetamine (ADDERALL XR) 25 MG 24 hr capsule TAKE 1 CAPSULE BY MOUTH EACH MORNING      amphetamine-dextroamphetamine (ADDERALL) 20 MG tablet Take 10-20 mg by mouth daily as needed (for focus if needed in afternoon).      chlorhexidine (HIBICLENS) 4 % solution Apply topically daily 473 mL 11    clindamycin (CLEOCIN T) 1 % external solution Apply topically 2 times daily 60 mL 11    doxycycline monohydrate (MONODOX) 100 MG capsule Take 1 capsule (100 mg) by mouth 2 times daily. 180 capsule 3    fluticasone-salmeterol (ADVAIR) 250-50 MCG/ACT inhaler Inhale 1 puff into the lungs every 12 hours      gabapentin (NEURONTIN) 300 MG capsule Take 1 capsule (300 mg) by mouth 2 times daily 60 capsule 11    JUNEL 1/20 1-20 MG-MCG tablet Take 1 tablet by mouth daily.      metFORMIN (GLUCOPHAGE) 500 MG tablet Take 1 tablet (500 mg) by mouth 2 times daily (with meals) 180 tablet 3    spironolactone (ALDACTONE) 100 MG tablet Take 1 tablet (100 mg) by mouth at bedtime 90 tablet 3    cyanocobalamin (CYANOCOBALAMIN) 1000 mcg/mL injection Inject 1,000 mcg into the muscle once every eight weeks. (Patient not taking: Reported on 1/27/2025)       Allergies   Allergen Reactions    Gluten Meal GI Disturbance     celiacs    Hydrocodone-Acetaminophen GI Disturbance    Levofloxacin      LEVAQUIN [id number] 03-             GI problems-vomiting    Metronidazole      FLAGYL  [id number] 03-             Other-vomiting.

## 2025-02-02 ENCOUNTER — MYC MEDICAL ADVICE (OUTPATIENT)
Dept: DERMATOLOGY | Facility: CLINIC | Age: 40
End: 2025-02-02
Payer: COMMERCIAL

## 2025-02-02 DIAGNOSIS — I89.0 LYMPHEDEMA: ICD-10-CM

## 2025-02-02 DIAGNOSIS — M25.511 RIGHT SHOULDER PAIN, UNSPECIFIED CHRONICITY: Primary | ICD-10-CM

## 2025-02-03 ENCOUNTER — TELEPHONE (OUTPATIENT)
Dept: DERMATOLOGY | Facility: CLINIC | Age: 40
End: 2025-02-03
Payer: COMMERCIAL

## 2025-02-03 NOTE — TELEPHONE ENCOUNTER
PA Initiation    Medication: HUMIRA *CF* 40 MG/0.4ML SC PSKT  Insurance Company: [employer] - Phone [phone] Fax [phone]  Pharmacy Filling the Rx: [facility] [place], [facility] - [address]  Filling Pharmacy Phone: [phone]  Filling Pharmacy Fax: [phone]  Start Date: 2/3/2025         Thank you,     Desmond Newton [facility]  Pharmacy Clinic [facility]  [email or web address]   Phone: [phone]  Fax: [phone]

## 2025-02-04 ENCOUNTER — THERAPY VISIT (OUTPATIENT)
Dept: OCCUPATIONAL THERAPY | Facility: CLINIC | Age: 40
End: 2025-02-04
Payer: COMMERCIAL

## 2025-02-04 DIAGNOSIS — I89.0 LYMPHEDEMA: ICD-10-CM

## 2025-02-04 PROCEDURE — 97165 OT EVAL LOW COMPLEX 30 MIN: CPT | Mod: GO | Performed by: OCCUPATIONAL THERAPIST

## 2025-02-04 PROCEDURE — 97140 MANUAL THERAPY 1/> REGIONS: CPT | Mod: GO | Performed by: OCCUPATIONAL THERAPIST

## 2025-02-04 NOTE — PROGRESS NOTES
"OCCUPATIONAL THERAPY EVALUATION  Type of Visit: Evaluation              Subjective        Presenting condition or subjective complaint: breast lymphodema  Other current medical issues:  1. Hidradenitis suppurativa: early Del Cid II affecting pubis/groin > axillae  - current: doxycycline 100 mg BID, adalimumab 40 mg weekly, chlorhexidine, clindamycin, spironolactone 100 mg at bedtime, OCPs, metformin  - Last quant. Gold: 10/2023  2. Recurrent right axillary subcutaneous nodule with axillary/R arm pain: possibly related to hidradenitis suppurativa  - prior imaging nonspecific  - Improves with doxycycline 100 mg BID  3. Livedo reticularis   - Pending lab evaluation  4. Dermatitis, R breast  - Resolved with doxycycline, mometasone  - Diagnostic mammography and R breast ultrasound normal  5. Swelling in right axilla with neuropathic symptoms in the right arm. DDX: lymphedema vs thoracic outlet syndrome  Date of onset: 02/03/25 (Order dated 2/3/2024. 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 3 sons, works as manager of  [employer]    Living Environment  Social support: With family members   Type of home: House   Stairs to enter the home: Yes 8 Is there a railing: Yes     Ramp: No   Stairs inside the home: Yes 8 Is there a railing: Yes     Help at home: None  Equipment owned:       Employment: Yes manager  Hobbies/Interests:      Patient goals for therapy: activities i love    Pain assessment: variable, more of a feeling of being \"unwell\" with fullness and variable symptoms     Objective       EDEMA EVALUATION  Additional history:  Body part affected by edema: right " breast, right arm, right back (RUQ)  If cancer related, treatment: NA   If not cancer related, problems with veins or cause of swelling: no  Distance able to walk:no problems with walking    Time able to stand: no problems standing   Sensation problems in hands/feet: Yes tinglingand cold feeling in arm  Edema etiology: Surgery, Unknown Pt had saline implant at age 19, then removal and reimplantation of implant (Silicone) in 2017. Has had variable symptoms in RUQ the past 4 years, with swollen lymph nodes, fullness, pain, tingling, coldness, affecting breast, back arm, subaxillary region, axilla    FUNCTIONAL SCALES   Lymphedema Life Impact Scale (LLIS):      Cognitive Status Examination  Orientation: Oriented to person, place and time   Level of Consciousness: Alert  Follows Commands and Answers Questions: 100% of the time  Personal Safety and Judgement: Intact  Memory: Intact    EDEMA  Skin Condition: Non-pitting mld swelling noted at lateral subaxillary region, axilla, back  Scar: Yes faint scar at inferior border of breast from implant  Capillary Refill: Symmetrical  Radial Pulse:   Dorsal Pedal Pulse:   Stemmer Sign: -  Ulceration: No    GIRTH MEASUREMENTS: Refer to separate girth measurement flowsheet for specific measurements.    VOLUME UE  Right UE Total Volume (mL):  1697 ml  Left UE Total Volume (mL):  1669 ml  UE Limb Comparison: RUE > LUE                RANGE OF MOTION: UE ROM WNL  STRENGTH: UE Strength WNL  POSTURE: WNL  PALPATION:   ACTIVITIES OF DAILY LIVING: Pt notes difficulty with caring for son with special needs son, including many physical tasks  BED MOBILITY: WNL  TRANSFERS: WNL  GAIT/LOCOMOTION: Independent  BALANCE: NT, no issues apparent  SENSATION: intact, however pt has intermittent tingling in RUQ  VASCULAR: NA  COORDINATION: NT, No deficits noted  MUSCLE TONE: WNL    Assessment & Plan   CLINICAL IMPRESSIONS  Medical Diagnosis: Lymphedema    Treatment Diagnosis: Lymphedema     Impression/Assessment: Pt is a 39 year old female presenting to Occupational Therapy due to Lymphedema of the RUQ. Cause of lymphedema is not entirely clear. Pt has history of breast implants placed at age 19, replaced in 2017. Since 2017.pt has experienced a variety of symptoms involving the RUQ, including fullness, swelling, tingling, intermittent swollen lymph nodes, sensation of cold, intermittent tingling. She has had a rash over her R breast, which is now clear. Pt notes fluctuations in symptoms may be hormonally driven, also varies with antibiotic use (doxicyclin). Pt has experienced additional stressors in the past 4 years including a divorce and adjustment.  The following significant findings have been identified: Impaired activity tolerance, Impaired sensation, and symptom burden of fullness .  These identified deficits interfere with their ability to perform recreational activities, household chores, and  yard work , gardening, heavy care duties for special needs son, as compared to previous level of function.     Clinical Decision Making (Complexity):  Assessment of Occupational Performance: 1-3 Performance Deficits  Occupational Performance Limitations: health management and maintenance, leisure activities, and work and care of family.  Clinical Decision Making (Complexity): Low complexity    PLAN OF CARE  Treatment Interventions:  Interventions: Manual Therapy    Long Term Goals   OT Goal 1  Goal Identifier: 1  Goal Description: Pt to report decrease of symptoms of fullness, intermittent pain, swollen lymph nodes, tingling to allow return to daily acitities, including heavy tasks related to caring for special needs son, gardening, sports  Target Date: 04/01/25  OT Goal 2  Goal Identifier: 2  Goal Description: Pt to demonstrate I with self MLD as strategy to enhance lymph flow  Rationale: In order to maximize safety and independence with ADL/IADLs  Target Date: 04/01/25  OT Goal 3  Goal Identifier:  3  Goal Description: Pt to demonstrate I with HEP to enhance lymph flow to reduce symptoms of fullness, heaviness, to allow return to daily activities  Rationale: In order to maximize safety and independence with ADL/IADLs  Target Date: 04/01/25  OT Goal 4  Goal Identifier: 4  Goal Description: Pt to obtain compression garment as ongoing tool to manage lymphedema  Rationale: In order to maximize safety and independence with ADL/IADLs  Target Date: 04/01/25      Frequency of Treatment: 1 x week  Duration of Treatment: 7 weeks     Recommended Referrals to Other Professionals: NA  Education Assessment: Learner/Method: Patient  Education Comments: Lymphatic system, role of manual lymph drainage in reducing symptom burden, increase comfort and ability to perform daily activities. Recommendation for compression shirt/tank top     Risks and benefits of evaluation/treatment have been explained.   Patient/Family/caregiver agrees with Plan of Care.     Evaluation Time:    OT Eval, Low Complexity Minutes (97596): 12       Signing Clinician: Jennifer Lemus OT

## 2025-02-06 NOTE — TELEPHONE ENCOUNTER
Prior Authorization Approval    Medication: HUMIRA *CF* 40 MG/0.4ML SC PSKT  Authorization Effective Date: 1/4/2025  Authorization Expiration Date: 2/5/2026  Approved Dose/Quantity: 4 each per 28 days  Reference #: [id number]   Insurance Company: [employer] - Phone [phone] Fax [phone]  Expected CoPay: $    CoPay Card Available:      Financial Assistance Needed:   Which Pharmacy is filling the prescription: [address]  Pharmacy Notified: Yes  Patient Notified: Yes             Thank you,     Desmond Newton [facility]  Pharmacy Clinic [facility]  [email or web address]   Phone: [phone]  Fax: [phone]

## 2025-06-17 DIAGNOSIS — L73.2 HIDRADENITIS SUPPURATIVA: ICD-10-CM

## 2025-06-17 NOTE — PROGRESS NOTES
Patient is due for follow up with Dr. Kuo, last visit on 1/27/2025:    1. Hidradenitis suppurativa: early Del Cid II affecting pubis/groin > axillae  - Minimal active inflammatory nodules today with some superficial folliculitis on the mons.  - continue doxycycline 100 mg bid  - continue metformin 500 mg BID  - Continue adalimumab 40 mg weekly,   - continue spironolactone 100 mg daily  - continue clindamycin lotion daily  - continue Hibiclens daily    Patient also met with MTM 1/14/2025. Refills provided per VESNA Kenyon, FranciscoD, MPH  Medication Therapy Management Pharmacist  [facility] Dermatology Clinic

## 2025-06-24 ENCOUNTER — TELEPHONE (OUTPATIENT)
Dept: DERMATOLOGY | Facility: CLINIC | Age: 40
End: 2025-06-24
Payer: COMMERCIAL

## 2025-06-24 NOTE — TELEPHONE ENCOUNTER
Refill request for Humira 40mg/0.4ml pen 2's from [facility] On [address], [address], in [place], [place].  Rx: N/A  [employer] ID: [id number]  Case Item: [id number]  Member: [id number]  Physicians call: [phone]  Fax: [phone]    Wallace Beckwith MA on 6/24/2025 at 1:08 PM

## 2025-06-24 NOTE — TELEPHONE ENCOUNTER
Refills sent 6/17, resent again 6/24 per VESNA Kenyon, FranciscoD, MPH  Medication Therapy Management Pharmacist  [facility] Dermatology [facility]

## (undated) DEVICE — ESU NDL COLORADO MICRO 3CM STR N103A

## (undated) DEVICE — SU PROLENE 4-0 PS-2 18" 8682G

## (undated) DEVICE — DRSG KERLIX FLUFFS X5

## (undated) DEVICE — DRSG STERI STRIP 1/2X4" R1547

## (undated) DEVICE — DRAPE BREAST/CHEST 29420

## (undated) DEVICE — PACK MINOR SBA15MIFSE

## (undated) DEVICE — SUCTION CANISTER MEDIVAC LINER 1500ML W/LID 65651-515

## (undated) DEVICE — SYR BULB IRRIG DOVER 60 ML LATEX FREE 67000

## (undated) DEVICE — GLOVE PROTEXIS BLUE W/NEU-THERA 6.5  2D73EB65

## (undated) DEVICE — GLOVE PROTEXIS W/NEU-THERA 6.0  2D73TE60

## (undated) DEVICE — SU PDS II 2-0 SH 27" Z317H

## (undated) DEVICE — ADH LIQUID MASTISOL TOPICAL VIAL 2-3ML 0523-48

## (undated) DEVICE — GLOVE PROTEXIS W/NEU-THERA 7.5  2D73TE75

## (undated) DEVICE — SU MONOCRYL 4-0 P-3 18" UND Y494G

## (undated) DEVICE — SU VICRYL 3-0 SH 27" J316H

## (undated) DEVICE — STPL SKIN 35W 054887

## (undated) DEVICE — SOL WATER IRRIG 1000ML BOTTLE 07139-09

## (undated) DEVICE — DRSG TEGADERM 2 3/8X2 3/4" 1624W

## (undated) DEVICE — SPECIMEN CONTAINER 4OZ

## (undated) DEVICE — SUCTION TIP YANKAUER W/O VENT K86

## (undated) DEVICE — NDL 19GA 1.5"

## (undated) DEVICE — ESU ELEC BLADE 6" COATED/INSULATED E1455-6

## (undated) DEVICE — NDL SPINAL 25GA 3.5" QUINCKE 405180

## (undated) DEVICE — ESU ELEC BLADE 2.75" COATED/INSULATED E1455

## (undated) DEVICE — DRSG ABDOMINAL 07 1/2X8" 7197D